# Patient Record
Sex: FEMALE | Race: WHITE | NOT HISPANIC OR LATINO | URBAN - METROPOLITAN AREA
[De-identification: names, ages, dates, MRNs, and addresses within clinical notes are randomized per-mention and may not be internally consistent; named-entity substitution may affect disease eponyms.]

---

## 2017-06-16 ENCOUNTER — OUTPATIENT (OUTPATIENT)
Dept: OUTPATIENT SERVICES | Facility: HOSPITAL | Age: 47
LOS: 1 days | Discharge: HOME | End: 2017-06-16

## 2017-06-16 DIAGNOSIS — N20.0 CALCULUS OF KIDNEY: ICD-10-CM

## 2017-06-16 DIAGNOSIS — L93.0 DISCOID LUPUS ERYTHEMATOSUS: ICD-10-CM

## 2017-06-16 DIAGNOSIS — I21.3 ST ELEVATION (STEMI) MYOCARDIAL INFARCTION OF UNSPECIFIED SITE: ICD-10-CM

## 2017-06-16 DIAGNOSIS — E78.5 HYPERLIPIDEMIA, UNSPECIFIED: ICD-10-CM

## 2017-06-26 ENCOUNTER — OUTPATIENT (OUTPATIENT)
Dept: OUTPATIENT SERVICES | Facility: HOSPITAL | Age: 47
LOS: 1 days | Discharge: HOME | End: 2017-06-26

## 2017-06-26 DIAGNOSIS — L93.0 DISCOID LUPUS ERYTHEMATOSUS: ICD-10-CM

## 2017-06-26 DIAGNOSIS — E78.5 HYPERLIPIDEMIA, UNSPECIFIED: ICD-10-CM

## 2017-06-26 DIAGNOSIS — N20.0 CALCULUS OF KIDNEY: ICD-10-CM

## 2017-06-26 DIAGNOSIS — I21.3 ST ELEVATION (STEMI) MYOCARDIAL INFARCTION OF UNSPECIFIED SITE: ICD-10-CM

## 2017-06-27 ENCOUNTER — OUTPATIENT (OUTPATIENT)
Dept: OUTPATIENT SERVICES | Facility: HOSPITAL | Age: 47
LOS: 1 days | Discharge: HOME | End: 2017-06-27

## 2017-06-27 DIAGNOSIS — E78.5 HYPERLIPIDEMIA, UNSPECIFIED: ICD-10-CM

## 2017-06-27 DIAGNOSIS — Z88.2 ALLERGY STATUS TO SULFONAMIDES: ICD-10-CM

## 2017-06-27 DIAGNOSIS — N25.89 OTHER DISORDERS RESULTING FROM IMPAIRED RENAL TUBULAR FUNCTION: ICD-10-CM

## 2017-06-27 DIAGNOSIS — L93.0 DISCOID LUPUS ERYTHEMATOSUS: ICD-10-CM

## 2017-06-27 DIAGNOSIS — I21.3 ST ELEVATION (STEMI) MYOCARDIAL INFARCTION OF UNSPECIFIED SITE: ICD-10-CM

## 2017-06-27 DIAGNOSIS — N20.0 CALCULUS OF KIDNEY: ICD-10-CM

## 2017-06-27 DIAGNOSIS — M35.00 SJOGREN SYNDROME, UNSPECIFIED: ICD-10-CM

## 2017-06-28 DIAGNOSIS — N25.89 OTHER DISORDERS RESULTING FROM IMPAIRED RENAL TUBULAR FUNCTION: ICD-10-CM

## 2017-06-28 DIAGNOSIS — N20.0 CALCULUS OF KIDNEY: ICD-10-CM

## 2017-06-28 DIAGNOSIS — N18.3 CHRONIC KIDNEY DISEASE, STAGE 3 (MODERATE): ICD-10-CM

## 2017-06-28 DIAGNOSIS — Z01.818 ENCOUNTER FOR OTHER PREPROCEDURAL EXAMINATION: ICD-10-CM

## 2017-08-07 ENCOUNTER — OUTPATIENT (OUTPATIENT)
Dept: OUTPATIENT SERVICES | Facility: HOSPITAL | Age: 47
LOS: 1 days | Discharge: HOME | End: 2017-08-07

## 2017-08-07 DIAGNOSIS — N20.0 CALCULUS OF KIDNEY: ICD-10-CM

## 2017-08-07 DIAGNOSIS — N18.3 CHRONIC KIDNEY DISEASE, STAGE 3 (MODERATE): ICD-10-CM

## 2017-08-07 DIAGNOSIS — N25.89 OTHER DISORDERS RESULTING FROM IMPAIRED RENAL TUBULAR FUNCTION: ICD-10-CM

## 2017-08-07 DIAGNOSIS — I21.3 ST ELEVATION (STEMI) MYOCARDIAL INFARCTION OF UNSPECIFIED SITE: ICD-10-CM

## 2017-08-07 DIAGNOSIS — L93.0 DISCOID LUPUS ERYTHEMATOSUS: ICD-10-CM

## 2017-08-07 DIAGNOSIS — E78.5 HYPERLIPIDEMIA, UNSPECIFIED: ICD-10-CM

## 2017-08-12 ENCOUNTER — INPATIENT (INPATIENT)
Facility: HOSPITAL | Age: 47
LOS: 3 days | Discharge: HOME | End: 2017-08-16
Attending: INTERNAL MEDICINE

## 2017-08-12 DIAGNOSIS — N20.0 CALCULUS OF KIDNEY: ICD-10-CM

## 2017-08-12 DIAGNOSIS — E78.5 HYPERLIPIDEMIA, UNSPECIFIED: ICD-10-CM

## 2017-08-12 DIAGNOSIS — I21.3 ST ELEVATION (STEMI) MYOCARDIAL INFARCTION OF UNSPECIFIED SITE: ICD-10-CM

## 2017-08-12 DIAGNOSIS — L93.0 DISCOID LUPUS ERYTHEMATOSUS: ICD-10-CM

## 2017-08-18 DIAGNOSIS — Z72.0 TOBACCO USE: ICD-10-CM

## 2017-08-18 DIAGNOSIS — M35.00 SJOGREN SYNDROME, UNSPECIFIED: ICD-10-CM

## 2017-08-18 DIAGNOSIS — I73.00 RAYNAUD'S SYNDROME WITHOUT GANGRENE: ICD-10-CM

## 2017-08-18 DIAGNOSIS — I25.10 ATHEROSCLEROTIC HEART DISEASE OF NATIVE CORONARY ARTERY WITHOUT ANGINA PECTORIS: ICD-10-CM

## 2017-08-18 DIAGNOSIS — M32.9 SYSTEMIC LUPUS ERYTHEMATOSUS, UNSPECIFIED: ICD-10-CM

## 2017-08-18 DIAGNOSIS — N25.89 OTHER DISORDERS RESULTING FROM IMPAIRED RENAL TUBULAR FUNCTION: ICD-10-CM

## 2017-08-18 DIAGNOSIS — I21.3 ST ELEVATION (STEMI) MYOCARDIAL INFARCTION OF UNSPECIFIED SITE: ICD-10-CM

## 2017-08-18 DIAGNOSIS — N18.4 CHRONIC KIDNEY DISEASE, STAGE 4 (SEVERE): ICD-10-CM

## 2017-08-18 DIAGNOSIS — Z87.442 PERSONAL HISTORY OF URINARY CALCULI: ICD-10-CM

## 2017-08-21 ENCOUNTER — OUTPATIENT (OUTPATIENT)
Dept: OUTPATIENT SERVICES | Facility: HOSPITAL | Age: 47
LOS: 1 days | Discharge: HOME | End: 2017-08-21

## 2017-08-21 DIAGNOSIS — E78.5 HYPERLIPIDEMIA, UNSPECIFIED: ICD-10-CM

## 2017-08-21 DIAGNOSIS — I21.3 ST ELEVATION (STEMI) MYOCARDIAL INFARCTION OF UNSPECIFIED SITE: ICD-10-CM

## 2017-08-21 DIAGNOSIS — N20.0 CALCULUS OF KIDNEY: ICD-10-CM

## 2017-08-21 DIAGNOSIS — N18.3 CHRONIC KIDNEY DISEASE, STAGE 3 (MODERATE): ICD-10-CM

## 2017-08-21 DIAGNOSIS — L93.0 DISCOID LUPUS ERYTHEMATOSUS: ICD-10-CM

## 2017-08-24 ENCOUNTER — TRANSCRIPTION ENCOUNTER (OUTPATIENT)
Age: 47
End: 2017-08-24

## 2017-08-24 PROBLEM — Z00.00 ENCOUNTER FOR PREVENTIVE HEALTH EXAMINATION: Status: ACTIVE | Noted: 2017-08-24

## 2017-09-18 ENCOUNTER — OUTPATIENT (OUTPATIENT)
Dept: OUTPATIENT SERVICES | Facility: HOSPITAL | Age: 47
LOS: 1 days | Discharge: HOME | End: 2017-09-18

## 2017-09-18 DIAGNOSIS — N25.89 OTHER DISORDERS RESULTING FROM IMPAIRED RENAL TUBULAR FUNCTION: ICD-10-CM

## 2017-09-18 DIAGNOSIS — N20.0 CALCULUS OF KIDNEY: ICD-10-CM

## 2017-09-18 DIAGNOSIS — L93.0 DISCOID LUPUS ERYTHEMATOSUS: ICD-10-CM

## 2017-09-18 DIAGNOSIS — N18.3 CHRONIC KIDNEY DISEASE, STAGE 3 (MODERATE): ICD-10-CM

## 2017-09-18 DIAGNOSIS — E78.5 HYPERLIPIDEMIA, UNSPECIFIED: ICD-10-CM

## 2017-09-18 DIAGNOSIS — I21.3 ST ELEVATION (STEMI) MYOCARDIAL INFARCTION OF UNSPECIFIED SITE: ICD-10-CM

## 2017-10-27 ENCOUNTER — APPOINTMENT (OUTPATIENT)
Dept: VASCULAR SURGERY | Facility: CLINIC | Age: 47
End: 2017-10-27
Payer: COMMERCIAL

## 2017-10-27 VITALS
SYSTOLIC BLOOD PRESSURE: 132 MMHG | WEIGHT: 128 LBS | BODY MASS INDEX: 24.17 KG/M2 | HEIGHT: 61 IN | DIASTOLIC BLOOD PRESSURE: 72 MMHG

## 2017-10-27 DIAGNOSIS — Z87.891 PERSONAL HISTORY OF NICOTINE DEPENDENCE: ICD-10-CM

## 2017-10-27 DIAGNOSIS — M35.00 SICCA SYNDROME, UNSPECIFIED: ICD-10-CM

## 2017-10-27 DIAGNOSIS — I25.10 ATHEROSCLEROTIC HEART DISEASE OF NATIVE CORONARY ARTERY W/OUT ANGINA PECTORIS: ICD-10-CM

## 2017-10-27 DIAGNOSIS — M32.9 SYSTEMIC LUPUS ERYTHEMATOSUS, UNSPECIFIED: ICD-10-CM

## 2017-10-27 DIAGNOSIS — I73.9 PERIPHERAL VASCULAR DISEASE, UNSPECIFIED: ICD-10-CM

## 2017-10-27 PROCEDURE — 93925 LOWER EXTREMITY STUDY: CPT

## 2017-10-27 PROCEDURE — 93978 VASCULAR STUDY: CPT

## 2017-10-27 PROCEDURE — 99203 OFFICE O/P NEW LOW 30 MIN: CPT

## 2017-10-27 RX ORDER — TICAGRELOR 90 MG/1
90 TABLET ORAL
Refills: 0 | Status: ACTIVE | COMMUNITY

## 2017-10-27 RX ORDER — POTASSIUM CITRATE 10 MEQ/1
10 MEQ TABLET, EXTENDED RELEASE ORAL
Refills: 0 | Status: ACTIVE | COMMUNITY

## 2017-10-27 RX ORDER — METOPROLOL SUCCINATE 25 MG/1
25 TABLET, EXTENDED RELEASE ORAL
Refills: 0 | Status: ACTIVE | COMMUNITY

## 2017-10-27 RX ORDER — LOSARTAN POTASSIUM 25 MG/1
25 TABLET, FILM COATED ORAL
Refills: 0 | Status: ACTIVE | COMMUNITY

## 2017-10-27 RX ORDER — ASPIRIN 81 MG
81 TABLET, DELAYED RELEASE (ENTERIC COATED) ORAL
Refills: 0 | Status: ACTIVE | COMMUNITY

## 2017-11-02 ENCOUNTER — INPATIENT (INPATIENT)
Facility: HOSPITAL | Age: 47
LOS: 0 days | Discharge: HOME | End: 2017-11-03
Attending: SURGERY

## 2017-11-02 DIAGNOSIS — L93.0 DISCOID LUPUS ERYTHEMATOSUS: ICD-10-CM

## 2017-11-02 DIAGNOSIS — E78.5 HYPERLIPIDEMIA, UNSPECIFIED: ICD-10-CM

## 2017-11-02 DIAGNOSIS — N20.0 CALCULUS OF KIDNEY: ICD-10-CM

## 2017-11-02 DIAGNOSIS — I21.3 ST ELEVATION (STEMI) MYOCARDIAL INFARCTION OF UNSPECIFIED SITE: ICD-10-CM

## 2017-11-06 DIAGNOSIS — M35.00 SJOGREN SYNDROME, UNSPECIFIED: ICD-10-CM

## 2017-11-06 DIAGNOSIS — Z96.0 PRESENCE OF UROGENITAL IMPLANTS: ICD-10-CM

## 2017-11-06 DIAGNOSIS — Z95.5 PRESENCE OF CORONARY ANGIOPLASTY IMPLANT AND GRAFT: ICD-10-CM

## 2017-11-06 DIAGNOSIS — N25.89 OTHER DISORDERS RESULTING FROM IMPAIRED RENAL TUBULAR FUNCTION: ICD-10-CM

## 2017-11-06 DIAGNOSIS — I25.2 OLD MYOCARDIAL INFARCTION: ICD-10-CM

## 2017-11-06 DIAGNOSIS — N18.3 CHRONIC KIDNEY DISEASE, STAGE 3 (MODERATE): ICD-10-CM

## 2017-11-06 DIAGNOSIS — I73.9 PERIPHERAL VASCULAR DISEASE, UNSPECIFIED: ICD-10-CM

## 2017-11-06 DIAGNOSIS — I70.213 ATHEROSCLEROSIS OF NATIVE ARTERIES OF EXTREMITIES WITH INTERMITTENT CLAUDICATION, BILATERAL LEGS: ICD-10-CM

## 2017-11-06 DIAGNOSIS — E78.5 HYPERLIPIDEMIA, UNSPECIFIED: ICD-10-CM

## 2017-11-06 DIAGNOSIS — F17.210 NICOTINE DEPENDENCE, CIGARETTES, UNCOMPLICATED: ICD-10-CM

## 2017-11-06 DIAGNOSIS — N20.0 CALCULUS OF KIDNEY: ICD-10-CM

## 2017-11-06 DIAGNOSIS — M32.9 SYSTEMIC LUPUS ERYTHEMATOSUS, UNSPECIFIED: ICD-10-CM

## 2017-11-06 DIAGNOSIS — I25.10 ATHEROSCLEROTIC HEART DISEASE OF NATIVE CORONARY ARTERY WITHOUT ANGINA PECTORIS: ICD-10-CM

## 2017-12-01 ENCOUNTER — APPOINTMENT (OUTPATIENT)
Dept: VASCULAR SURGERY | Facility: CLINIC | Age: 47
End: 2017-12-01
Payer: COMMERCIAL

## 2017-12-01 PROCEDURE — 99213 OFFICE O/P EST LOW 20 MIN: CPT

## 2018-02-09 ENCOUNTER — APPOINTMENT (OUTPATIENT)
Dept: VASCULAR SURGERY | Facility: CLINIC | Age: 48
End: 2018-02-09
Payer: COMMERCIAL

## 2018-02-09 PROCEDURE — 99212 OFFICE O/P EST SF 10 MIN: CPT

## 2018-02-23 ENCOUNTER — OUTPATIENT (OUTPATIENT)
Dept: OUTPATIENT SERVICES | Facility: HOSPITAL | Age: 48
LOS: 1 days | Discharge: HOME | End: 2018-02-23

## 2018-02-23 VITALS
TEMPERATURE: 97 F | DIASTOLIC BLOOD PRESSURE: 76 MMHG | OXYGEN SATURATION: 99 % | WEIGHT: 141.98 LBS | RESPIRATION RATE: 16 BRPM | SYSTOLIC BLOOD PRESSURE: 118 MMHG | HEART RATE: 77 BPM | HEIGHT: 61 IN

## 2018-02-23 DIAGNOSIS — Z98.890 OTHER SPECIFIED POSTPROCEDURAL STATES: Chronic | ICD-10-CM

## 2018-02-23 DIAGNOSIS — Z98.891 HISTORY OF UTERINE SCAR FROM PREVIOUS SURGERY: Chronic | ICD-10-CM

## 2018-02-23 DIAGNOSIS — Z01.818 ENCOUNTER FOR OTHER PREPROCEDURAL EXAMINATION: ICD-10-CM

## 2018-02-23 DIAGNOSIS — I74.09 OTHER ARTERIAL EMBOLISM AND THROMBOSIS OF ABDOMINAL AORTA: ICD-10-CM

## 2018-02-23 LAB
BLD GP AB SCN SERPL QL: SIGNIFICANT CHANGE UP
TYPE + AB SCN PNL BLD: SIGNIFICANT CHANGE UP

## 2018-02-23 RX ORDER — METOPROLOL TARTRATE 50 MG
1 TABLET ORAL
Qty: 0 | Refills: 0 | COMMUNITY

## 2018-02-23 NOTE — H&P PST ADULT - REASON FOR ADMISSION
pt for aorto-bifemoral bypass  pt c/o leg heaviness and inability to walk on-going for two years saw vascular and now for scheduled procedure   pt denies current cp,sob,palp,cough,dysuria   cannot assess ex prasad pt unable to walk -limited by decreased circulation pt states this is complete med/surg history

## 2018-02-23 NOTE — H&P PST ADULT - PMH
CAD (coronary artery disease)    Chronic kidney disease  stage III  H/O heart artery stent  lad  Lupus    Myocardial infarction  8-2017  Renal tubular acidosis    Sjogren's disease

## 2018-02-24 LAB
ALBUMIN SERPL ELPH-MCNC: 4.6 G/DL — SIGNIFICANT CHANGE UP (ref 3–5.5)
ALP SERPL-CCNC: 95 U/L — SIGNIFICANT CHANGE UP (ref 30–115)
ALT FLD-CCNC: 27 U/L — SIGNIFICANT CHANGE UP (ref 0–41)
ANION GAP SERPL CALC-SCNC: 6 MMOL/L — LOW (ref 7–14)
APPEARANCE UR: CLEAR — SIGNIFICANT CHANGE UP
APTT BLD: 26.3 SEC — LOW (ref 27–39.2)
AST SERPL-CCNC: 18 U/L — SIGNIFICANT CHANGE UP (ref 0–41)
BASOPHILS # BLD AUTO: 0.04 K/UL — SIGNIFICANT CHANGE UP (ref 0–0.2)
BASOPHILS NFR BLD AUTO: 0.5 % — SIGNIFICANT CHANGE UP (ref 0–1)
BILIRUB SERPL-MCNC: 0.4 MG/DL — SIGNIFICANT CHANGE UP (ref 0.2–1.2)
BILIRUB UR-MCNC: NEGATIVE — SIGNIFICANT CHANGE UP
BUN SERPL-MCNC: 24 MG/DL — HIGH (ref 10–20)
CALCIUM SERPL-MCNC: 9.8 MG/DL — SIGNIFICANT CHANGE UP (ref 8.5–10.1)
CHLORIDE SERPL-SCNC: 109 MMOL/L — SIGNIFICANT CHANGE UP (ref 98–110)
CO2 SERPL-SCNC: 24 MMOL/L — SIGNIFICANT CHANGE UP (ref 17–32)
COLOR SPEC: YELLOW — SIGNIFICANT CHANGE UP
CREAT SERPL-MCNC: 1.6 MG/DL — HIGH (ref 0.7–1.5)
DIFF PNL FLD: NEGATIVE — SIGNIFICANT CHANGE UP
EOSINOPHIL # BLD AUTO: 0.23 K/UL — SIGNIFICANT CHANGE UP (ref 0–0.7)
EOSINOPHIL NFR BLD AUTO: 3 % — SIGNIFICANT CHANGE UP (ref 0–8)
GLUCOSE SERPL-MCNC: 73 MG/DL — SIGNIFICANT CHANGE UP (ref 70–110)
GLUCOSE UR QL: NEGATIVE MG/DL — SIGNIFICANT CHANGE UP
HCT VFR BLD CALC: 39.3 % — SIGNIFICANT CHANGE UP (ref 37–47)
HGB BLD-MCNC: 12.8 G/DL — LOW (ref 14–18)
IMM GRANULOCYTES NFR BLD AUTO: 0.3 % — SIGNIFICANT CHANGE UP (ref 0.1–0.3)
INR BLD: 0.92 RATIO — SIGNIFICANT CHANGE UP (ref 0.65–1.3)
KETONES UR-MCNC: NEGATIVE — SIGNIFICANT CHANGE UP
LEUKOCYTE ESTERASE UR-ACNC: NEGATIVE — SIGNIFICANT CHANGE UP
LYMPHOCYTES # BLD AUTO: 3.03 K/UL — SIGNIFICANT CHANGE UP (ref 1.2–3.4)
LYMPHOCYTES # BLD AUTO: 39.5 % — SIGNIFICANT CHANGE UP (ref 20.5–51.1)
MCHC RBC-ENTMCNC: 30.8 PG — SIGNIFICANT CHANGE UP (ref 27–31)
MCHC RBC-ENTMCNC: 32.6 G/DL — SIGNIFICANT CHANGE UP (ref 32–37)
MCV RBC AUTO: 94.5 FL — HIGH (ref 81–91)
MONOCYTES # BLD AUTO: 0.99 K/UL — HIGH (ref 0.1–0.6)
MONOCYTES NFR BLD AUTO: 12.9 % — HIGH (ref 1.7–9.3)
NEUTROPHILS # BLD AUTO: 3.36 K/UL — SIGNIFICANT CHANGE UP (ref 1.4–6.5)
NEUTROPHILS NFR BLD AUTO: 43.8 % — SIGNIFICANT CHANGE UP (ref 42.2–75.2)
NITRITE UR-MCNC: NEGATIVE — SIGNIFICANT CHANGE UP
NRBC # BLD: 0 /100 WBCS — SIGNIFICANT CHANGE UP (ref 0–0)
PH UR: 7 — SIGNIFICANT CHANGE UP (ref 5–8)
PLATELET # BLD AUTO: 304 K/UL — SIGNIFICANT CHANGE UP (ref 130–400)
POTASSIUM SERPL-MCNC: 4.8 MMOL/L — SIGNIFICANT CHANGE UP (ref 3.5–5)
POTASSIUM SERPL-SCNC: 4.8 MMOL/L — SIGNIFICANT CHANGE UP (ref 3.5–5)
PROT SERPL-MCNC: 8.3 G/DL — HIGH (ref 6–8)
PROT UR-MCNC: NEGATIVE MG/DL — SIGNIFICANT CHANGE UP
PROTHROM AB SERPL-ACNC: 9.9 SEC — LOW (ref 9.95–12.87)
RBC # BLD: 4.16 M/UL — LOW (ref 4.2–5.4)
RBC # FLD: 13.4 % — SIGNIFICANT CHANGE UP (ref 11.5–14.5)
SODIUM SERPL-SCNC: 139 MMOL/L — SIGNIFICANT CHANGE UP (ref 135–146)
SP GR SPEC: 1.01 — SIGNIFICANT CHANGE UP (ref 1.01–1.03)
UROBILINOGEN FLD QL: 0.2 MG/DL — SIGNIFICANT CHANGE UP (ref 0.2–0.2)
WBC # BLD: 7.67 K/UL — SIGNIFICANT CHANGE UP (ref 4.8–10.8)
WBC # FLD AUTO: 7.67 K/UL — SIGNIFICANT CHANGE UP (ref 4.8–10.8)

## 2018-03-05 ENCOUNTER — INPATIENT (INPATIENT)
Facility: HOSPITAL | Age: 48
LOS: 6 days | Discharge: HOME | End: 2018-03-12
Attending: SURGERY

## 2018-03-05 ENCOUNTER — APPOINTMENT (OUTPATIENT)
Dept: VASCULAR SURGERY | Facility: HOSPITAL | Age: 48
End: 2018-03-05
Payer: COMMERCIAL

## 2018-03-05 ENCOUNTER — RESULT REVIEW (OUTPATIENT)
Age: 48
End: 2018-03-05

## 2018-03-05 VITALS
HEIGHT: 61 IN | SYSTOLIC BLOOD PRESSURE: 92 MMHG | WEIGHT: 139.99 LBS | DIASTOLIC BLOOD PRESSURE: 59 MMHG | RESPIRATION RATE: 18 BRPM | HEART RATE: 69 BPM | TEMPERATURE: 98 F

## 2018-03-05 DIAGNOSIS — Z98.890 OTHER SPECIFIED POSTPROCEDURAL STATES: Chronic | ICD-10-CM

## 2018-03-05 DIAGNOSIS — Z98.891 HISTORY OF UTERINE SCAR FROM PREVIOUS SURGERY: Chronic | ICD-10-CM

## 2018-03-05 LAB
ABO RH CONFIRMATION: SIGNIFICANT CHANGE UP
ANION GAP SERPL CALC-SCNC: 9 MMOL/L — SIGNIFICANT CHANGE UP (ref 7–14)
BASE EXCESS BLDA CALC-SCNC: -6.2 MMOL/L — LOW (ref -2–2)
BASOPHILS # BLD AUTO: 0.04 K/UL — SIGNIFICANT CHANGE UP (ref 0–0.2)
BASOPHILS NFR BLD AUTO: 0.2 % — SIGNIFICANT CHANGE UP (ref 0–1)
BUN SERPL-MCNC: 20 MG/DL — SIGNIFICANT CHANGE UP (ref 10–20)
CALCIUM SERPL-MCNC: 8.8 MG/DL — SIGNIFICANT CHANGE UP (ref 8.5–10.1)
CHLORIDE SERPL-SCNC: 109 MMOL/L — SIGNIFICANT CHANGE UP (ref 98–110)
CO2 SERPL-SCNC: 19 MMOL/L — SIGNIFICANT CHANGE UP (ref 17–32)
CREAT SERPL-MCNC: 1.4 MG/DL — SIGNIFICANT CHANGE UP (ref 0.7–1.5)
EOSINOPHIL # BLD AUTO: 0 K/UL — SIGNIFICANT CHANGE UP (ref 0–0.7)
EOSINOPHIL NFR BLD AUTO: 0 % — SIGNIFICANT CHANGE UP (ref 0–8)
GAS PNL BLDA: SIGNIFICANT CHANGE UP
GLUCOSE SERPL-MCNC: 187 MG/DL — HIGH (ref 70–110)
HCO3 BLDA-SCNC: 19 MMOL/L — LOW (ref 23–27)
HCT VFR BLD CALC: 34.2 % — LOW (ref 37–47)
HGB BLD-MCNC: 11.5 G/DL — LOW (ref 12–16)
HOROWITZ INDEX BLDA+IHG-RTO: 32 — SIGNIFICANT CHANGE UP
IMM GRANULOCYTES NFR BLD AUTO: 0.3 % — SIGNIFICANT CHANGE UP (ref 0.1–0.3)
LYMPHOCYTES # BLD AUTO: 1 K/UL — LOW (ref 1.2–3.4)
LYMPHOCYTES # BLD AUTO: 5.5 % — LOW (ref 20.5–51.1)
MAGNESIUM SERPL-MCNC: 2 MG/DL — SIGNIFICANT CHANGE UP (ref 1.8–2.4)
MCHC RBC-ENTMCNC: 31.3 PG — HIGH (ref 27–31)
MCHC RBC-ENTMCNC: 33.6 G/DL — SIGNIFICANT CHANGE UP (ref 32–37)
MCV RBC AUTO: 92.9 FL — SIGNIFICANT CHANGE UP (ref 81–99)
MONOCYTES # BLD AUTO: 1.06 K/UL — HIGH (ref 0.1–0.6)
MONOCYTES NFR BLD AUTO: 5.8 % — SIGNIFICANT CHANGE UP (ref 1.7–9.3)
NEUTROPHILS # BLD AUTO: 16.1 K/UL — HIGH (ref 1.4–6.5)
NEUTROPHILS NFR BLD AUTO: 88.2 % — HIGH (ref 42.2–75.2)
PCO2 BLDA: 38 MMHG — SIGNIFICANT CHANGE UP (ref 38–42)
PH BLDA: 7.32 — LOW (ref 7.38–7.42)
PLATELET # BLD AUTO: 237 K/UL — SIGNIFICANT CHANGE UP (ref 130–400)
PO2 BLDA: 145 MMHG — HIGH (ref 78–95)
POTASSIUM SERPL-MCNC: 4.3 MMOL/L — SIGNIFICANT CHANGE UP (ref 3.5–5)
POTASSIUM SERPL-SCNC: 4.3 MMOL/L — SIGNIFICANT CHANGE UP (ref 3.5–5)
RBC # BLD: 3.68 M/UL — LOW (ref 4.2–5.4)
RBC # FLD: 13.2 % — SIGNIFICANT CHANGE UP (ref 11.5–14.5)
SAO2 % BLDA: 99 % — HIGH (ref 94–98)
SODIUM SERPL-SCNC: 137 MMOL/L — SIGNIFICANT CHANGE UP (ref 135–146)
WBC # BLD: 18.25 K/UL — HIGH (ref 4.8–10.8)
WBC # FLD AUTO: 18.25 K/UL — HIGH (ref 4.8–10.8)

## 2018-03-05 PROCEDURE — 35646 BPG AORTOBIFEMORAL: CPT

## 2018-03-05 PROCEDURE — 35371 RECHANNELING OF ARTERY: CPT | Mod: 50,59

## 2018-03-05 RX ORDER — MEPERIDINE HYDROCHLORIDE 50 MG/ML
12.5 INJECTION INTRAMUSCULAR; INTRAVENOUS; SUBCUTANEOUS
Qty: 0 | Refills: 0 | Status: DISCONTINUED | OUTPATIENT
Start: 2018-03-05 | End: 2018-03-06

## 2018-03-05 RX ORDER — SODIUM CHLORIDE 9 MG/ML
1000 INJECTION, SOLUTION INTRAVENOUS
Qty: 0 | Refills: 0 | Status: DISCONTINUED | OUTPATIENT
Start: 2018-03-05 | End: 2018-03-06

## 2018-03-05 RX ORDER — METOPROLOL TARTRATE 50 MG
TABLET ORAL
Qty: 0 | Refills: 0 | Status: DISCONTINUED | OUTPATIENT
Start: 2018-03-05 | End: 2018-03-06

## 2018-03-05 RX ORDER — MORPHINE SULFATE 50 MG/1
30 CAPSULE, EXTENDED RELEASE ORAL
Qty: 0 | Refills: 0 | Status: DISCONTINUED | OUTPATIENT
Start: 2018-03-05 | End: 2018-03-06

## 2018-03-05 RX ORDER — CEFAZOLIN SODIUM 1 G
1000 VIAL (EA) INJECTION EVERY 8 HOURS
Qty: 0 | Refills: 0 | Status: COMPLETED | OUTPATIENT
Start: 2018-03-06 | End: 2018-03-06

## 2018-03-05 RX ORDER — CEFAZOLIN SODIUM 1 G
VIAL (EA) INJECTION
Qty: 0 | Refills: 0 | Status: COMPLETED | OUTPATIENT
Start: 2018-03-05 | End: 2018-03-07

## 2018-03-05 RX ORDER — MORPHINE SULFATE 50 MG/1
2 CAPSULE, EXTENDED RELEASE ORAL
Qty: 0 | Refills: 0 | Status: DISCONTINUED | OUTPATIENT
Start: 2018-03-05 | End: 2018-03-06

## 2018-03-05 RX ORDER — CEFAZOLIN SODIUM 1 G
1000 VIAL (EA) INJECTION ONCE
Qty: 0 | Refills: 0 | Status: COMPLETED | OUTPATIENT
Start: 2018-03-05 | End: 2018-03-05

## 2018-03-05 RX ORDER — METOPROLOL TARTRATE 50 MG
5 TABLET ORAL ONCE
Qty: 0 | Refills: 0 | Status: COMPLETED | OUTPATIENT
Start: 2018-03-05 | End: 2018-03-05

## 2018-03-05 RX ORDER — DEXTROSE MONOHYDRATE, SODIUM CHLORIDE, AND POTASSIUM CHLORIDE 50; .745; 4.5 G/1000ML; G/1000ML; G/1000ML
1000 INJECTION, SOLUTION INTRAVENOUS
Qty: 0 | Refills: 0 | Status: DISCONTINUED | OUTPATIENT
Start: 2018-03-05 | End: 2018-03-07

## 2018-03-05 RX ORDER — MORPHINE SULFATE 50 MG/1
4 CAPSULE, EXTENDED RELEASE ORAL
Qty: 0 | Refills: 0 | Status: DISCONTINUED | OUTPATIENT
Start: 2018-03-05 | End: 2018-03-06

## 2018-03-05 RX ORDER — ONDANSETRON 8 MG/1
4 TABLET, FILM COATED ORAL ONCE
Qty: 0 | Refills: 0 | Status: DISCONTINUED | OUTPATIENT
Start: 2018-03-05 | End: 2018-03-06

## 2018-03-05 RX ORDER — METOPROLOL TARTRATE 50 MG
5 TABLET ORAL EVERY 6 HOURS
Qty: 0 | Refills: 0 | Status: DISCONTINUED | OUTPATIENT
Start: 2018-03-06 | End: 2018-03-06

## 2018-03-05 RX ADMIN — MORPHINE SULFATE 30 MILLILITER(S): 50 CAPSULE, EXTENDED RELEASE ORAL at 21:00

## 2018-03-05 RX ADMIN — MORPHINE SULFATE 4 MILLIGRAM(S): 50 CAPSULE, EXTENDED RELEASE ORAL at 19:00

## 2018-03-05 RX ADMIN — MORPHINE SULFATE 4 MILLIGRAM(S): 50 CAPSULE, EXTENDED RELEASE ORAL at 20:00

## 2018-03-05 RX ADMIN — DEXTROSE MONOHYDRATE, SODIUM CHLORIDE, AND POTASSIUM CHLORIDE 75 MILLILITER(S): 50; .745; 4.5 INJECTION, SOLUTION INTRAVENOUS at 22:00

## 2018-03-05 RX ADMIN — Medication 110 MILLIGRAM(S): at 21:08

## 2018-03-05 RX ADMIN — MORPHINE SULFATE 4 MILLIGRAM(S): 50 CAPSULE, EXTENDED RELEASE ORAL at 19:48

## 2018-03-05 RX ADMIN — MORPHINE SULFATE 4 MILLIGRAM(S): 50 CAPSULE, EXTENDED RELEASE ORAL at 18:49

## 2018-03-05 RX ADMIN — SODIUM CHLORIDE 100 MILLILITER(S): 9 INJECTION, SOLUTION INTRAVENOUS at 18:30

## 2018-03-05 RX ADMIN — Medication 100 MILLIGRAM(S): at 21:53

## 2018-03-05 RX ADMIN — SODIUM CHLORIDE 100 MILLILITER(S): 9 INJECTION, SOLUTION INTRAVENOUS at 20:46

## 2018-03-05 NOTE — BRIEF OPERATIVE NOTE - OPERATION/FINDINGS
Small vessel size. Occluded distal aorta. End-to-end aortobifemoral bypass distal to the renal vessels, above the ROHINI. Minimal soft eccentric plaque in the left common femoral artery. Bilateral CFA, SFA, PFA patent and soft.

## 2018-03-05 NOTE — PROGRESS NOTE ADULT - SUBJECTIVE AND OBJECTIVE BOX
Procedure: Status post aortobifem  Post Operative day #1    Pmh:  Myocardial infarction  Chronic kidney disease  H/O heart artery stent  CAD (coronary artery disease)  Renal tubular acidosis  Lupus  Sjogren's disease  Claudication of both lower extremities  Aortobifemoral bypass graft  H/O  section  H/O cystoscopy  H/O lithotripsy     allergies:sulfa drugs (Rash)    Meds:  ceFAZolin   IVPB      dextrose 5% + sodium chloride 0.45% with potassium chloride 20 mEq/L 1000 milliLiter(s) IV Continuous <Continuous>  lactated ringers. 1000 milliLiter(s) IV Continuous <Continuous>  meperidine     Injectable 12.5 milliGRAM(s) IV Push every 10 minutes PRN  metoprolol   tartrate IVPB      morphine  - Injectable 4 milliGRAM(s) IV Push every 10 minutes PRN  morphine  - Injectable 2 milliGRAM(s) IV Push every 10 minutes PRN  morphine PCA (1 mG/mL) 30 milliLiter(s) PCA Continuous PCA Continuous  ondansetron Injectable 4 milliGRAM(s) IV Push once PRN      Vitals:    T(C): 36.9 (18 @ 21:00), Max: 36.9 (18 @ 21:00)  HR: 90 (18 @ 19:34) (69 - 98)  BP: 128/71 (18 @ 19:34) (92/59 - 128/71)  RR: 17 (18 @ 19:34) (12 - 18)  SpO2: 96% (18 @ 19:34) (96% - 98%)    18 @ 07:01  -  18 @ 22:09  --------------------------------------------------------  IN: 100 mL / OUT: 250 mL / NET: -150 mL      General:Alert and oriented times 3, not in acute distress   Heart: Regular rate and rhythm, no rubs , murmurs or gallops  Lungs: Clear to auscultation bilaterally, no wheezes, rales, rhonci appreciated  Abdomen: Soft , positive bowel sounds, no tenderness, no distention, no peritoneal signs ,incision clean dry and intact  Exremites:Groin-  b/l incisions clean dry and intact,  warm extremities,  good color, no swelling,no hematomas, motor and sensation , pulses present bilaterally. Absent left posterior tibialis pulse.       Labs:             11.5   18.25 )-----------( 237      (  @ 20:18 )             34.2                    137   |  109   |  20                 Ca: 8.8    BMP:   ----------------------------< 187    M.0   (18 @ 20:18)             4.3    |  19    | 1.4                Ph: x          ABG - ( 05 Mar 2018 20:00 )  pH: 7.32  /  pCO2: 38    /  pO2: 145   / HCO3: 19    / Base Excess: -6.2  /  SaO2: 99

## 2018-03-05 NOTE — CONSULT NOTE ADULT - ASSESSMENT
ASSESSMENT:  47y Female ***    PLAN:   Neurologic: gcs 15, morphine pca for pain  Respiratory: cta b/l  Cardiovascular: hx of recent mi s/p stent, htn +s1s2, rrr, continue metoprolol iv , hold asa/brilinta   Gastrointestinal/Nutrition: soft mild perincisional pain npo, ngt    Renal/Genitourinary: renal tubular acidosis, ckd4 making good urine, barroso, continue ivf, trend cr 1.6 preop > 1.4 post op  Hematologic: pad lupus, sjogren syndrome, dopplerable pulses bilaterally hold heparin sq, no scds per vascular team  Infectious Disease: ancef x 24hs   Lines/Tubes: piv, r rad coty, barroso  Endocrine: no diagnosisis   Disposition: icu monitering    --------------------------------------------------------------------------------------    Critical Care Diagnoses:

## 2018-03-05 NOTE — PROGRESS NOTE ADULT - ASSESSMENT
Assessment and Plan:  Pt. 47yFemale status post aortobifem    -Ngt in place record outpt  -Jackson in place record outpt  -Pain managment  - NPO   -ICU managment    LALY Juan   03-05-18 @ 22:09  # [id number]/ [id number]

## 2018-03-05 NOTE — BRIEF OPERATIVE NOTE - PROCEDURE
<<-----Click on this checkbox to enter Procedure Aortobifemoral bypass graft  03/05/2018  Aortobifemoral bypass with Dacron graft  Active  RAHEEL

## 2018-03-05 NOTE — ASU PREOP CHECKLIST - AS TEMP SITE
"Anesthesia Post Evaluation    Patient: Jovan Del Cid Jr.    Procedure(s) Performed: Procedure(s) (LRB):  COLONOSCOPY (N/A)    Final Anesthesia Type: MAC  Patient location during evaluation: PACU  Patient participation: Yes- Able to Participate  Level of consciousness: awake  Post-procedure vital signs: reviewed and stable  Pain management: adequate  Airway patency: patent  PONV status at discharge: No PONV  Anesthetic complications: no      Cardiovascular status: blood pressure returned to baseline and hemodynamically stable  Respiratory status: unassisted, spontaneous ventilation and room air  Hydration status: euvolemic  Follow-up not needed.        Visit Vitals  /80 (BP Location: Left arm, Patient Position: Lying)   Pulse 69   Temp 36.9 °C (98.4 °F) (Oral)   Resp 18   Ht 5' 7" (1.702 m)   Wt 96.6 kg (213 lb)   SpO2 95%   BMI 33.36 kg/m²       Pain/Roxie Score: Pain Assessment Performed: Yes (11/13/2017  8:55 AM)  Presence of Pain: denies (11/13/2017  8:55 AM)      " oral

## 2018-03-05 NOTE — PRE-ANESTHESIA EVALUATION ADULT - NSANTHOSAYNRD_GEN_A_CORE
No. HENRI screening performed.  STOP BANG Legend: 0-2 = LOW Risk; 3-4 = INTERMEDIATE Risk; 5-8 = HIGH Risk

## 2018-03-05 NOTE — CONSULT NOTE ADULT - SUBJECTIVE AND OBJECTIVE BOX
SICU Consultation Note  =====================================================  HPI: 47y Female  HPI:   48yo female with history of recent mi s/p pci found to have occluded  distal aorta with claudication taken today for aortobifemoral bypass, stable during procedure, received hydrazine 10mg prior before pacu for hypertension       Surgery Information  OR time:  5h    EBL:   300       IV Fluids:3600       Blood Products:   UOP:   705       PAST MEDICAL & SURGICAL HISTORY:  Myocardial infarction: , ef 55-65%  Chronic kidney disease: stage III  H/O heart artery stent: lad  CAD (coronary artery disease)  Renal tubular acidosis  Lupus  Sjogren's disease  H/O  section  H/O cystoscopy: last being 318-16  H/O lithotripsy: eswl x5 last being -17    Home Meds: Home Medications:  Aspir 81: orally once a day (05 Mar 2018 10:16)  Brilinta (ticagrelor) 90 mg oral tablet: 1 tab(s) orally 2 times a day (05 Mar 2018 10:16)  Evoxac 30 mg oral capsule: orally 4 times a day (05 Mar 2018 10:16)  losartan 25 mg oral tablet: 1 tab(s) orally once a day (in the evening) (05 Mar 2018 10:16)  metoprolol succinate 25 mg oral tablet, extended release: 1 tab(s) orally once (at bedtime) (05 Mar 2018 10:16)  simvastatin 20 mg oral tablet: 1 tab(s) orally once a day (at bedtime) (05 Mar 2018 10:16)  traMADol: 75 milligram(s) orally once a day (05 Mar 2018 10:16)  Urocit-K 10 mEq oral tablet, extended release: 1 tab(s) orally 3 times a day (05 Mar 2018 10:16)    Allergies: Allergies    sulfa drugs (Rash)    Intolerances      Soc:   Advanced Directives: Presumed Full Code     ROS:    REVIEW OF SYSTEMS    [ ] A ten-point review of systems was otherwise negative except as noted.      CURRENT MEDICATIONS:   --------------------------------------------------------------------------------------  Neurologic Medications  meperidine     Injectable 12.5 milliGRAM(s) IV Push every 10 minutes PRN Shivering  morphine  - Injectable 4 milliGRAM(s) IV Push every 10 minutes PRN Severe Pain  morphine  - Injectable 2 milliGRAM(s) IV Push every 10 minutes PRN Moderate Pain (4 - 6)  morphine PCA (1 mG/mL) 30 milliLiter(s) PCA Continuous PCA Continuous  ondansetron Injectable 4 milliGRAM(s) IV Push once PRN Nausea and/or Vomiting    Respiratory Medications    Cardiovascular Medications  metoprolol   tartrate IVPB        Gastrointestinal Medications  dextrose 5% + sodium chloride 0.45% with potassium chloride 20 mEq/L 1000 milliLiter(s) IV Continuous <Continuous>  lactated ringers. 1000 milliLiter(s) IV Continuous <Continuous>    Genitourinary Medications    Hematologic/Oncologic Medications    Antimicrobial/Immunologic Medications  ceFAZolin   IVPB 1000 milliGRAM(s) IV Intermittent once  ceFAZolin   IVPB        Endocrine/Metabolic Medications    Topical/Other Medications    --------------------------------------------------------------------------------------    VITAL SIGNS, INS/OUTS (last 24 hours):  --------------------------------------------------------------------------------------  ICU Vital Signs Last 24 Hrs  T(C): 36.6 (05 Mar 2018 19:34), Max: 36.8 (05 Mar 2018 10:18)  T(F): 97.8 (05 Mar 2018 19:34), Max: 98.2 (05 Mar 2018 10:18)  HR: 90 (05 Mar 2018 19:34) (69 - 98)  BP: 128/71 (05 Mar 2018 19:34) (92/59 - 128/71)  BP(mean): --  ABP: 134/62 (05 Mar 2018 19:34) (134/62 - 152/62)  ABP(mean): --  RR: 17 (05 Mar 2018 19:34) (12 - 18)  SpO2: 96% (05 Mar 2018 19:34) (96% - 98%)    I&O's Summary    05 Mar 2018 07:01  -  05 Mar 2018 21:10  --------------------------------------------------------  IN: 100 mL / OUT: 250 mL / NET: -150 mL      --------------------------------------------------------------------------------------    EXAM:  General/Neuro  Exam: Normal, NAD, alert, oriented x 3, no focal deficits. PERRLA      Respiratory  Exam: Lungs clear to auscultation, Normal expansion/effort.     Cardiovascular  Exam: S1, S2.  Regular rate and rhythm.    Cardiac Rhythm: Normal Sinus Rhythm  ECHO: 55-65%     GI  Exam: Abdomen soft, Non-tender, Non-distended.    Nasogastric tube in place.   Wound:   abdominal and groin incisions c/d/i soft no hematoma  Current Diet:  NPO      Tubes/Lines/Drains  ***  [x] Peripheral IV  [x] Arterial Line		[x] R	[] L	[] Fem	[x Rad	[] Ax	Date Placed: 3/5/18          [x] Urinary Catheter		Date Placed: 3/5/18    Extremities  Exam: Extremities warm, pink, well-perfused.  dopplerable and palpable pt/dp on right, left pt palp, dp dopplerable c/w prior to surgery      Derm:  Exam: Good skin turgor, no skin breakdown.      :   Exam: Jackson catheter in place.     LABS  --------------------------------------------------------------------------------------  Labs:  CAPILLARY BLOOD GLUCOSE                              11.5   18.25 )-----------( 237      ( 05 Mar 2018 20:18 )             34.2       Auto Neutrophil %: 88.2 % (18 @ 20:18)  Auto Immature Granulocyte %: 0.3 % (18 @ 20:18)        137  |  109  |  20  ----------------------------<  187<H>  4.3   |  19  |  1.4      Calcium, Total Serum: 8.8 mg/dL (18 @ 20:18)  Magnesium, Serum: 2.0 mg/dL (18 @ 20:18)      LFTs:     Blood Gas Arterial, Lactate: 1.3 mmoL/L (18 @ 20:00)    ABG - ( 05 Mar 2018 20:00 )  pH: 7.32  /  pCO2: 38    /  pO2: 145   / HCO3: 19    / Base Excess: -6.2  /  SaO2: 99                Coags:              --------------------------------------------------------------------------------------    OTHER LABS    IMAGING RESULTS

## 2018-03-06 LAB
ANION GAP SERPL CALC-SCNC: 5 MMOL/L — LOW (ref 7–14)
ANION GAP SERPL CALC-SCNC: 7 MMOL/L — SIGNIFICANT CHANGE UP (ref 7–14)
BUN SERPL-MCNC: 18 MG/DL — SIGNIFICANT CHANGE UP (ref 10–20)
BUN SERPL-MCNC: 19 MG/DL — SIGNIFICANT CHANGE UP (ref 10–20)
CALCIUM SERPL-MCNC: 9 MG/DL — SIGNIFICANT CHANGE UP (ref 8.5–10.1)
CALCIUM SERPL-MCNC: 9 MG/DL — SIGNIFICANT CHANGE UP (ref 8.5–10.1)
CHLORIDE SERPL-SCNC: 112 MMOL/L — HIGH (ref 98–110)
CHLORIDE SERPL-SCNC: 112 MMOL/L — HIGH (ref 98–110)
CK MB BLD-MCNC: 2 % — SIGNIFICANT CHANGE UP (ref 0–4)
CK MB CFR SERPL CALC: 1.2 NG/ML — SIGNIFICANT CHANGE UP (ref 0.6–6.3)
CK MB CFR SERPL CALC: 1.5 NG/ML — SIGNIFICANT CHANGE UP (ref 0.6–6.3)
CK SERPL-CCNC: 249 U/L — HIGH (ref 0–225)
CK SERPL-CCNC: 99 U/L — SIGNIFICANT CHANGE UP (ref 0–225)
CO2 SERPL-SCNC: 17 MMOL/L — SIGNIFICANT CHANGE UP (ref 17–32)
CO2 SERPL-SCNC: 20 MMOL/L — SIGNIFICANT CHANGE UP (ref 17–32)
CREAT SERPL-MCNC: 1.4 MG/DL — SIGNIFICANT CHANGE UP (ref 0.7–1.5)
CREAT SERPL-MCNC: 1.4 MG/DL — SIGNIFICANT CHANGE UP (ref 0.7–1.5)
GAS PNL BLDA: SIGNIFICANT CHANGE UP
GLUCOSE SERPL-MCNC: 109 MG/DL — SIGNIFICANT CHANGE UP (ref 70–110)
GLUCOSE SERPL-MCNC: 120 MG/DL — HIGH (ref 70–110)
HCT VFR BLD CALC: 31.9 % — LOW (ref 37–47)
HGB BLD-MCNC: 10.8 G/DL — LOW (ref 12–16)
MAGNESIUM SERPL-MCNC: 2.4 MG/DL — SIGNIFICANT CHANGE UP (ref 1.8–2.4)
MCHC RBC-ENTMCNC: 31.8 PG — HIGH (ref 27–31)
MCHC RBC-ENTMCNC: 33.9 G/DL — SIGNIFICANT CHANGE UP (ref 32–37)
MCV RBC AUTO: 93.8 FL — SIGNIFICANT CHANGE UP (ref 81–99)
NRBC # BLD: 0 /100 WBCS — SIGNIFICANT CHANGE UP (ref 0–0)
PHOSPHATE SERPL-MCNC: 2.8 MG/DL — SIGNIFICANT CHANGE UP (ref 2.1–4.9)
PLATELET # BLD AUTO: 223 K/UL — SIGNIFICANT CHANGE UP (ref 130–400)
POTASSIUM SERPL-MCNC: 4.6 MMOL/L — SIGNIFICANT CHANGE UP (ref 3.5–5)
POTASSIUM SERPL-MCNC: 4.9 MMOL/L — SIGNIFICANT CHANGE UP (ref 3.5–5)
POTASSIUM SERPL-SCNC: 4.6 MMOL/L — SIGNIFICANT CHANGE UP (ref 3.5–5)
POTASSIUM SERPL-SCNC: 4.9 MMOL/L — SIGNIFICANT CHANGE UP (ref 3.5–5)
RBC # BLD: 3.4 M/UL — LOW (ref 4.2–5.4)
RBC # FLD: 13.7 % — SIGNIFICANT CHANGE UP (ref 11.5–14.5)
SODIUM SERPL-SCNC: 136 MMOL/L — SIGNIFICANT CHANGE UP (ref 135–146)
SODIUM SERPL-SCNC: 137 MMOL/L — SIGNIFICANT CHANGE UP (ref 135–146)
TROPONIN I SERPL-MCNC: <0.02 NG/ML — SIGNIFICANT CHANGE UP (ref 0–0.05)
TROPONIN I SERPL-MCNC: <0.02 NG/ML — SIGNIFICANT CHANGE UP (ref 0–0.05)
WBC # BLD: 15.14 K/UL — HIGH (ref 4.8–10.8)
WBC # FLD AUTO: 15.14 K/UL — HIGH (ref 4.8–10.8)

## 2018-03-06 RX ORDER — PANTOPRAZOLE SODIUM 20 MG/1
40 TABLET, DELAYED RELEASE ORAL DAILY
Qty: 0 | Refills: 0 | Status: DISCONTINUED | OUTPATIENT
Start: 2018-03-06 | End: 2018-03-09

## 2018-03-06 RX ORDER — METOPROLOL TARTRATE 50 MG
5 TABLET ORAL EVERY 6 HOURS
Qty: 0 | Refills: 0 | Status: DISCONTINUED | OUTPATIENT
Start: 2018-03-06 | End: 2018-03-08

## 2018-03-06 RX ORDER — HYDROMORPHONE HYDROCHLORIDE 2 MG/ML
1 INJECTION INTRAMUSCULAR; INTRAVENOUS; SUBCUTANEOUS ONCE
Qty: 0 | Refills: 0 | Status: DISCONTINUED | OUTPATIENT
Start: 2018-03-06 | End: 2018-03-06

## 2018-03-06 RX ORDER — ACETAMINOPHEN 500 MG
650 TABLET ORAL EVERY 6 HOURS
Qty: 0 | Refills: 0 | Status: DISCONTINUED | OUTPATIENT
Start: 2018-03-06 | End: 2018-03-06

## 2018-03-06 RX ORDER — ONDANSETRON 8 MG/1
4 TABLET, FILM COATED ORAL EVERY 6 HOURS
Qty: 0 | Refills: 0 | Status: DISCONTINUED | OUTPATIENT
Start: 2018-03-06 | End: 2018-03-12

## 2018-03-06 RX ORDER — NALOXONE HYDROCHLORIDE 4 MG/.1ML
0.1 SPRAY NASAL
Qty: 0 | Refills: 0 | Status: DISCONTINUED | OUTPATIENT
Start: 2018-03-06 | End: 2018-03-09

## 2018-03-06 RX ORDER — HYDROMORPHONE HYDROCHLORIDE 2 MG/ML
30 INJECTION INTRAMUSCULAR; INTRAVENOUS; SUBCUTANEOUS
Qty: 0 | Refills: 0 | Status: DISCONTINUED | OUTPATIENT
Start: 2018-03-06 | End: 2018-03-09

## 2018-03-06 RX ORDER — ACETAMINOPHEN 500 MG
650 TABLET ORAL EVERY 6 HOURS
Qty: 0 | Refills: 0 | Status: DISCONTINUED | OUTPATIENT
Start: 2018-03-06 | End: 2018-03-07

## 2018-03-06 RX ADMIN — HYDROMORPHONE HYDROCHLORIDE 30 MILLILITER(S): 2 INJECTION INTRAMUSCULAR; INTRAVENOUS; SUBCUTANEOUS at 18:53

## 2018-03-06 RX ADMIN — Medication 100 MILLIGRAM(S): at 06:27

## 2018-03-06 RX ADMIN — Medication 100 MILLIGRAM(S): at 21:52

## 2018-03-06 RX ADMIN — PANTOPRAZOLE SODIUM 40 MILLIGRAM(S): 20 TABLET, DELAYED RELEASE ORAL at 16:03

## 2018-03-06 RX ADMIN — DEXTROSE MONOHYDRATE, SODIUM CHLORIDE, AND POTASSIUM CHLORIDE 75 MILLILITER(S): 50; .745; 4.5 INJECTION, SOLUTION INTRAVENOUS at 12:30

## 2018-03-06 RX ADMIN — HYDROMORPHONE HYDROCHLORIDE 1 MILLIGRAM(S): 2 INJECTION INTRAMUSCULAR; INTRAVENOUS; SUBCUTANEOUS at 16:56

## 2018-03-06 RX ADMIN — Medication 100 MILLIGRAM(S): at 16:04

## 2018-03-06 RX ADMIN — MORPHINE SULFATE 30 MILLILITER(S): 50 CAPSULE, EXTENDED RELEASE ORAL at 12:30

## 2018-03-06 RX ADMIN — Medication 5 MILLIGRAM(S): at 12:29

## 2018-03-06 RX ADMIN — Medication 5 MILLIGRAM(S): at 06:26

## 2018-03-06 RX ADMIN — Medication 5 MILLIGRAM(S): at 18:55

## 2018-03-06 RX ADMIN — HYDROMORPHONE HYDROCHLORIDE 1 MILLIGRAM(S): 2 INJECTION INTRAMUSCULAR; INTRAVENOUS; SUBCUTANEOUS at 16:03

## 2018-03-06 NOTE — PROGRESS NOTE ADULT - SUBJECTIVE AND OBJECTIVE BOX
Admit Date: 3/5/18  OR Date: 3/5/18  ICU Date: 3/5/18    Indication for ICU: s/p aortobifemoral bypass for occluded distal aorta with claudication     PAST MEDICAL & SURGICAL HISTORY:  Myocardial infarction: -  Chronic kidney disease: stage III  H/O heart artery stent: lad  CAD (coronary artery disease)  Renal tubular acidosis  Lupus  Sjogren's disease  H/O  section  H/O cystoscopy: last being 3-18-16  H/O lithotripsy: eswl x5 last being 17    Allergies  sulfa drugs (Rash)    Home Medications:  Aspir 81: orally once a day (05 Mar 2018 10:16)  Brilinta (ticagrelor) 90 mg oral tablet: 1 tab(s) orally 2 times a day (05 Mar 2018 10:16)  Evoxac 30 mg oral capsule: orally 4 times a day (05 Mar 2018 10:16)  losartan 25 mg oral tablet: 1 tab(s) orally once a day (in the evening) (05 Mar 2018 10:16)  metoprolol succinate 25 mg oral tablet, extended release: 1 tab(s) orally once (at bedtime) (05 Mar 2018 10:16)  simvastatin 20 mg oral tablet: 1 tab(s) orally once a day (at bedtime) (05 Mar 2018 10:16)  traMADol: 75 milligram(s) orally once a day (05 Mar 2018 10:16)  Urocit-K 10 mEq oral tablet, extended release: 1 tab(s) orally 3 times a day (05 Mar 2018 10:16)      HPI/LAST 24 HOUR EVENTS:   46yo female with history of recent mi s/p pci found to have occluded  distal aorta with claudication, s/p aortobifemoral bypass, stable during procedure, received hydrazine 10mg before pacu for hypertension.  DP/PT palpable b/l and extremities warm.       Surgery Information  OR time:  5h    EBL:   300       IV Fluids:3600       UOP:   705       ROS:  [x ] A ten-point review of systems was otherwise negative except as noted. Admit Date: 3/5/18  OR Date: 3/5/18  ICU Date: 3/5/18    Indication for ICU: s/p aortobifemoral bypass for occluded distal aorta with claudication     PAST MEDICAL & SURGICAL HISTORY:  Myocardial infarction: -  Chronic kidney disease: stage III  H/O heart artery stent: lad  CAD (coronary artery disease)  Renal tubular acidosis  Lupus  Sjogren's disease  H/O  section  H/O cystoscopy: last being 3-18-16  H/O lithotripsy: eswl x5 last being 17    Allergies  sulfa drugs (Rash)    Home Medications:  Aspir 81: orally once a day (05 Mar 2018 10:16)  Brilinta (ticagrelor) 90 mg oral tablet: 1 tab(s) orally 2 times a day (05 Mar 2018 10:16)  Evoxac 30 mg oral capsule: orally 4 times a day (05 Mar 2018 10:16)  losartan 25 mg oral tablet: 1 tab(s) orally once a day (in the evening) (05 Mar 2018 10:16)  metoprolol succinate 25 mg oral tablet, extended release: 1 tab(s) orally once (at bedtime) (05 Mar 2018 10:16)  simvastatin 20 mg oral tablet: 1 tab(s) orally once a day (at bedtime) (05 Mar 2018 10:16)  traMADol: 75 milligram(s) orally once a day (05 Mar 2018 10:16)  Urocit-K 10 mEq oral tablet, extended release: 1 tab(s) orally 3 times a day (05 Mar 2018 10:16)      HPI/LAST 24 HOUR EVENTS:   46yo female with history of recent mi s/p pci found to have occluded  distal aorta with claudication, s/p aortobifemoral bypass, stable during procedure, received hydrazine 10mg before pacu for hypertension.  DP/PT palpable b/l and extremities warm.       Surgery Information  OR time:  5h    EBL:   300       IV Fluids:3600       UOP:   705       ROS:  [x ] A ten-point review of systems was otherwise negative except as noted.      HD: 1d  Daily Height in cm: 154.94 (05 Mar 2018 11:26)    Daily Weight in k (06 Mar 2018 04:00)    Diet, NPO:   With Hard Candy  With Ice Chips/Sips of Water (18 @ 19:03)      CURRENT MEDS:  Neurologic Medications  morphine PCA (1 mG/mL) 30 milliLiter(s) PCA Continuous PCA Continuous    Respiratory Medications    Cardiovascular Medications  metoprolol    tartrate Injectable 5 milliGRAM(s) IV Push every 6 hours    Gastrointestinal Medications  dextrose 5% + sodium chloride 0.45% with potassium chloride 20 mEq/L 1000 milliLiter(s) IV Continuous <Continuous>    Genitourinary Medications    Hematologic/Oncologic Medications    Antimicrobial/Immunologic Medications  ceFAZolin   IVPB 1000 milliGRAM(s) IV Intermittent every 8 hours  ceFAZolin   IVPB        Endocrine/Metabolic Medications    Topical/Other Medications      ICU Vital Signs Last 24 Hrs  T(C): 37.8 (06 Mar 2018 04:00), Max: 37.8 (06 Mar 2018 04:00)  T(F): 100 (06 Mar 2018 04:00), Max: 100 (06 Mar 2018 04:00)  HR: 82 (06 Mar 2018 05:00) (69 - 98)  BP: 132/70 (06 Mar 2018 05:00) (92/59 - 146/68)  BP(mean): 90 (06 Mar 2018 05:00) (84 - 103)  ABP: 138/62 (06 Mar 2018 05:00) (134/62 - 160/72)  ABP(mean): 84 (06 Mar 2018 05:00) (84 - 100)  RR: 14 (06 Mar 2018 05:00) (11 - 18)  SpO2: 94% (06 Mar 2018 05:00) (93% - 100%)      Adult Advanced Hemodynamics Last 24 Hrs  CVP(mm Hg): --  CVP(cm H2O): --  CO: --  CI: --  PA: --  PA(mean): --  PCWP: --  SVR: --  SVRI: --  PVR: --  PVRI: --      ABG - ( 05 Mar 2018 20:00 )  pH: 7.32  /  pCO2: 38    /  pO2: 145   / HCO3: 19    / Base Excess: -6.2  /  SaO2: 99                  I&O's Summary    05 Mar 2018 07:01  -  06 Mar 2018 05:44  --------------------------------------------------------  IN: 870 mL / OUT: 1130 mL / NET: -260 mL      I&O's Detail    05 Mar 2018 07:01  -  06 Mar 2018 05:44  --------------------------------------------------------  IN:    dextrose 5% + sodium chloride 0.45% with potassium chloride 20 mEq/L: 600 mL    IV PiggyBack: 50 mL    lactated ringers.: 220 mL  Total IN: 870 mL    OUT:    Indwelling Catheter - Urethral: 1120 mL    Nasoenteral Tube: 10 mL  Total OUT: 1130 mL    Total NET: -260 mL      uop /hr    PHYSICAL EXAM:    General/Neuro  RASS:             GCS:  15     Deficits:                             alert & oriented x 3, no focal deficits  Pupils: perrla    Lungs:      clear to auscultation, Normal expansion/effort.     Cardiovascular : S1, S2.  Regular rate and rhythm.  Peripheral edema   Cardiac Rhythm: Normal Sinus Rhythm    GI: Abdomen soft, Non-tender, Non-distended.  c/o back pain  Nasogastric tube in place.    Wound: abdominal and bilateral groin incisions clean, dry, no hematoma    Extremities: Extremities warm, pink, well-perfused. Pulses:Rt  palp pt/dp   Lt palp pt dop dp    Derm: Good skin turgor, no skin breakdown.      :       Jackson catheter in place.      CXR:     LABS:    Labs:  CAPILLARY BLOOD GLUCOSE                              11.5   18.25 )-----------( 237      ( 05 Mar 2018 20:18 )             34.2       Auto Neutrophil %: 88.2 % (18 @ 20:18)  Auto Immature Granulocyte %: 0.3 % (18 @ 20:18)        137  |  109  |  20  ----------------------------<  187<H>  4.3   |  19  |  1.4      Calcium, Total Serum: 8.8 mg/dL (18 @ 20:18)  Magnesium, Serum: 2.0 mg/dL (18 @ 20:18)      LFTs:     Blood Gas Arterial, Lactate: 1.3 mmoL/L (18 @ 20:00)    ABG - ( 05 Mar 2018 20:00 )  pH: 7.32  /  pCO2: 38    /  pO2: 145   / HCO3: 19    / Base Excess: -6.2  /  SaO2: 99                Coags:    CARDIAC MARKERS ( 05 Mar 2018 22:46 )  <0.02 ng/mL / x     / 99 U/L / x     / 1.5 ng/mL

## 2018-03-06 NOTE — PROGRESS NOTE ADULT - SUBJECTIVE AND OBJECTIVE BOX
S/P Aortobifem 3/    PAST MEDICAL & SURGICAL HISTORY:  Myocardial infarction:   Chronic kidney disease: stage III  H/O heart artery stent: lad  CAD (coronary artery disease)  Renal tubular acidosis  Lupus  Sjogren's disease  H/O  section  H/O cystoscopy: last being 3-18-16  H/O lithotripsy: eswl x5 last being 17    Vital Signs Last 24 Hrs  T(C): 37.8 (06 Mar 2018 04:00), Max: 37.8 (06 Mar 2018 04:00)  T(F): 100 (06 Mar 2018 04:00), Max: 100 (06 Mar 2018 04:00)  HR: 80 (06 Mar 2018 06:00) (69 - 98)  BP: 132/70 (06 Mar 2018 05:00) (92/59 - 146/68)  BP(mean): 90 (06 Mar 2018 05:00) (84 - 103)  RR: 11 (06 Mar 2018 06:00) (11 - 18)  SpO2: 94% (06 Mar 2018 06:00) (93% - 100%)    I&O's Detail    05 Mar 2018 07:01  -  06 Mar 2018 06:45  --------------------------------------------------------  IN:    dextrose 5% + sodium chloride 0.45% with potassium chloride 20 mEq/L: 675 mL    Enteral Tube Flush: 40 mL    IV PiggyBack: 100 mL    lactated ringers.: 220 mL  Total IN: 1035 mL    OUT:    Indwelling Catheter - Urethral: 1235 mL    Nasoenteral Tube: 50 mL  Total OUT: 1285 mL    Total NET: -250 mL               11.5   18.25 )-----------( 237      (  @ 20:18 )             34.2                  137   |  109   |  20                 Ca: 8.8    BMP:   ----------------------------< 187    M.0   (18 @ 20:18)             4.3    |  19    | 1.4                Ph: x          CARDIAC MARKERS ( 05 Mar 2018 22:46 )  <0.02 ng/mL / x     / 99 U/L / x     / 1.5 ng/mL    ABG - ( 05 Mar 2018 20:00 )  pH: 7.32  /  pCO2: 38    /  pO2: 145   / HCO3: 19    / Base Excess: -6.2  /  SaO2: 99          MEDICATIONS  (STANDING):  ceFAZolin   IVPB 1000 milliGRAM(s) IV Intermittent every 8 hours  ceFAZolin   IVPB      dextrose 5% + sodium chloride 0.45% with potassium chloride 20 mEq/L 1000 milliLiter(s) (75 mL/Hr) IV Continuous <Continuous>  metoprolol    tartrate Injectable 5 milliGRAM(s) IV Push every 6 hours  morphine PCA (1 mG/mL) 30 milliLiter(s) PCA Continuous PCA Continuous    Diet, NPO:   With Hard Candy  With Ice Chips/Sips of Water (18 @ 19:03)    PHYSICAL EXAM:  General Appearance: NAD, NG tube in place  Neck: Supple  Chest: Equal expansion bilaterally, equal breath sounds  CV: S1, S2, RRR  Abdomen: Soft, ND, tender at incisions  Extremities: Grossly symmetric, WNL. DP palpable bilat, PT dopplerable.   Neuro: A&Ox3  Incisions: Dry, dressing intact

## 2018-03-06 NOTE — PROGRESS NOTE ADULT - ASSESSMENT
ASSESSMENT/PLAN: 47y Female s/p aortobifemoral bypass for occluded distal aorta with claudication        Neurologic: GCS 15, on Morphine PCA pump for pain    Respiratory: Incentive spirometer, keep bedrest for now    Cardiovascular: recent h/o of MI and PCI/stent (on ASA/Brillinta), 1st set of CE negative, f/up 2 mores sets.    Gastrointestinal/Nutrition: keep NPO with NGT in place, D51/2NS @ 75/hr    Genitourinary/Renal: barroso in place, strict I and Os    Hematologic: h/h stable    Infectious Disease: received Ancef tony-op    Endocrine: no endo issues    Disposition: cont SICU care ASSESSMENT/PLAN: 47y Female s/p aortobifemoral bypass for occluded distal aorta with claudication     Neurologic: GCS 15, on Morphine PCA pump for pain    Respiratory: cta b/l  Incentive spirometer, keep bedrest for now    Cardiovascular: recent h/o of MI and PCI/stent  (on ASA/Brillinta), 1st set of CE negative, f/up 2 mores sets.   continue metoprolol iv , hold asa/brilinta     Gastrointestinal/Nutrition: keep NPO with NGT in place, D51/2NS @ 75/hr    Genitourinary/Renal:  renal tubular acidosis, ckd4 making good urine barroso in place, strict I and Os  continue ivf, trend cr 1.6 preop > 1.4 post op    Hematologic: pad lupus, sjogren syndrome, dopplerable pulses bilaterally hold heparin sq, no scds per vascular team  h/h stable    Infectious Disease: received Ancef tony-op    Lines/Tubes: piv, r rad coty, barroso    Endocrine: no endo issues    Disposition: cont SICU care

## 2018-03-07 LAB
ANION GAP SERPL CALC-SCNC: 3 MMOL/L — LOW (ref 7–14)
APTT BLD: 24.1 SEC — LOW (ref 27–39.2)
BUN SERPL-MCNC: 20 MG/DL — SIGNIFICANT CHANGE UP (ref 10–20)
CALCIUM SERPL-MCNC: 8.9 MG/DL — SIGNIFICANT CHANGE UP (ref 8.5–10.1)
CHLORIDE SERPL-SCNC: 115 MMOL/L — HIGH (ref 98–110)
CO2 SERPL-SCNC: 20 MMOL/L — SIGNIFICANT CHANGE UP (ref 17–32)
CREAT SERPL-MCNC: 1.3 MG/DL — SIGNIFICANT CHANGE UP (ref 0.7–1.5)
GLUCOSE SERPL-MCNC: 121 MG/DL — HIGH (ref 70–110)
HCT VFR BLD CALC: 31.6 % — LOW (ref 37–47)
HGB BLD-MCNC: 10.4 G/DL — LOW (ref 12–16)
INR BLD: 1.09 RATIO — SIGNIFICANT CHANGE UP (ref 0.65–1.3)
MAGNESIUM SERPL-MCNC: 2.2 MG/DL — SIGNIFICANT CHANGE UP (ref 1.8–2.4)
MCHC RBC-ENTMCNC: 31 PG — SIGNIFICANT CHANGE UP (ref 27–31)
MCHC RBC-ENTMCNC: 32.9 G/DL — SIGNIFICANT CHANGE UP (ref 32–37)
MCV RBC AUTO: 94.3 FL — SIGNIFICANT CHANGE UP (ref 81–99)
NRBC # BLD: 0 /100 WBCS — SIGNIFICANT CHANGE UP (ref 0–0)
PHOSPHATE SERPL-MCNC: 2.8 MG/DL — SIGNIFICANT CHANGE UP (ref 2.1–4.9)
PLATELET # BLD AUTO: 208 K/UL — SIGNIFICANT CHANGE UP (ref 130–400)
POTASSIUM SERPL-MCNC: 4.7 MMOL/L — SIGNIFICANT CHANGE UP (ref 3.5–5)
POTASSIUM SERPL-SCNC: 4.7 MMOL/L — SIGNIFICANT CHANGE UP (ref 3.5–5)
PROTHROM AB SERPL-ACNC: 11.8 SEC — SIGNIFICANT CHANGE UP (ref 9.95–12.87)
RBC # BLD: 3.35 M/UL — LOW (ref 4.2–5.4)
RBC # FLD: 13.7 % — SIGNIFICANT CHANGE UP (ref 11.5–14.5)
SODIUM SERPL-SCNC: 138 MMOL/L — SIGNIFICANT CHANGE UP (ref 135–146)
WBC # BLD: 15.02 K/UL — HIGH (ref 4.8–10.8)
WBC # FLD AUTO: 15.02 K/UL — HIGH (ref 4.8–10.8)

## 2018-03-07 RX ORDER — LOSARTAN POTASSIUM 100 MG/1
25 TABLET, FILM COATED ORAL DAILY
Qty: 0 | Refills: 0 | Status: DISCONTINUED | OUTPATIENT
Start: 2018-03-07 | End: 2018-03-12

## 2018-03-07 RX ORDER — SODIUM CHLORIDE 9 MG/ML
1000 INJECTION, SOLUTION INTRAVENOUS
Qty: 0 | Refills: 0 | Status: DISCONTINUED | OUTPATIENT
Start: 2018-03-07 | End: 2018-03-11

## 2018-03-07 RX ADMIN — Medication 62.5 MILLIMOLE(S): at 03:12

## 2018-03-07 RX ADMIN — SODIUM CHLORIDE 75 MILLILITER(S): 9 INJECTION, SOLUTION INTRAVENOUS at 21:50

## 2018-03-07 RX ADMIN — Medication 5 MILLIGRAM(S): at 06:31

## 2018-03-07 RX ADMIN — Medication 5 MILLIGRAM(S): at 01:03

## 2018-03-07 NOTE — CHART NOTE - NSCHARTNOTEFT_GEN_A_CORE
Pt is POD# 2 S/P Aortobifemoral bypass   Doing well.  Remains NPO with NGT in place on low intermittent suction secondary to blood tinged output.  On Protonix for GI prophylaxis.  Pain well controlled on Dilaudid PCA.  Distal pulses remain palpable though right>left.  Started pt on home Losartan PO.  Remains on B-blocker IV.   Need to address restarting ASA/Brillinta and Heparin SQ.  Pt hemodynamically stable for transfer to floor.  Sign out given to  Pt is POD# 2 S/P Aortobifemoral bypass   Doing well.  Remains NPO with NGT in place on low intermittent suction secondary to blood tinged output.  On Protonix for GI prophylaxis.  Pain well controlled on Dilaudid PCA.  Distal pulses remain palpable though right>left.  Started pt on home Losartan PO.  Remains on B-blocker IV.   Need to address restarting ASA/Brillinta and Heparin SQ.  Pt hemodynamically stable for transfer to floor.  Sign out given to Dr Chan Aguilar.

## 2018-03-07 NOTE — PROGRESS NOTE ADULT - ATTENDING COMMENTS
Assessment and plan above were discussed with me over the phone. I was not present for examination.
Assessment and plan above were modified and discussed with residents, physician assistants, and nurses.  I have provided 25  min of Critical Care to this patient.

## 2018-03-07 NOTE — PROGRESS NOTE ADULT - SUBJECTIVE AND OBJECTIVE BOX
Admit Date: 3/5/18  OR Date: 3/5/18  ICU Date: 3/5/18    Indication for ICU: s/p aortobifemoral bypass occluded  distal aorta with claudication        24 HOUR EVENTS:   ·	Continued Bed rest, NG tube in place  ·	Pulses remain stable with palpable B/L though right stronger than left.  All dopplerable.    ·	Switched to Dilaudid PCA   ·	Per Vascular> no ASA or NSAIDS.          PAST MEDICAL & SURGICAL HISTORY:  Myocardial infarction:   CKD- III  H/O heart artery stent: lad  CAD   Renal tubular acidosis  Lupus  Sjogren's disease  H/O  section  H/O cystoscopy: last being 3-18-16  H/O lithotripsy: eswl x5 last being 17    Allergies  sulfa drugs (Rash)    Home Medications:  Aspir 81: orally once a day (05 Mar 2018 10:16)  Brilinta (ticagrelor) 90 mg oral tablet: 1 tab(s) orally 2 times a day (05 Mar 2018 10:16)  Evoxac 30 mg oral capsule: orally 4 times a day (05 Mar 2018 10:16)  losartan 25 mg oral tablet: 1 tab(s) orally once a day (in the evening) (05 Mar 2018 10:16)  metoprolol succinate 25 mg oral tablet, extended release: 1 tab(s) orally once (at bedtime) (05 Mar 2018 10:16)  simvastatin 20 mg oral tablet: 1 tab(s) orally once a day (at bedtime) (05 Mar 2018 10:16)  traMADol: 75 milligram(s) orally once a day (05 Mar 2018 10:16)  Urocit-K 10 mEq oral tablet, extended release: 1 tab(s) orally 3 times a day (05 Mar 2018 10:16)      ROS: Admit Date: 3/5/18  OR Date: 3/5/18  ICU Date: 3/5/18    Indication for ICU: s/p aortobifemoral bypass occluded  distal aorta with claudication        24 HOUR EVENTS:   ·	Continued Bed rest, NG tube in place  ·	Pulses remain stable with palpable B/L though right stronger than left.  All dopplerable.    ·	Switched to Dilaudid PCA   ·	Per Vascular> no ASA or NSAIDS.          PAST MEDICAL & SURGICAL HISTORY:  Myocardial infarction:   CKD- III  H/O heart artery stent: lad  CAD   Renal tubular acidosis  Lupus  Sjogren's disease  H/O  section  H/O cystoscopy: last being 3-18-16  H/O lithotripsy: eswl x5 last being 17    Allergies  sulfa drugs (Rash)    Home Medications:  Aspir 81: orally once a day (05 Mar 2018 10:16)  Brilinta (ticagrelor) 90 mg oral tablet: 1 tab(s) orally 2 times a day (05 Mar 2018 10:16)  Evoxac 30 mg oral capsule: orally 4 times a day (05 Mar 2018 10:16)  losartan 25 mg oral tablet: 1 tab(s) orally once a day (in the evening) (05 Mar 2018 10:16)  metoprolol succinate 25 mg oral tablet, extended release: 1 tab(s) orally once (at bedtime) (05 Mar 2018 10:16)  simvastatin 20 mg oral tablet: 1 tab(s) orally once a day (at bedtime) (05 Mar 2018 10:16)  traMADol: 75 milligram(s) orally once a day (05 Mar 2018 10:16)  Urocit-K 10 mEq oral tablet, extended release: 1 tab(s) orally 3 times a day (05 Mar 2018 10:16)      ROS:    Daily     Daily     Diet, NPO:   With Hard Candy  With Ice Chips/Sips of Water (18 @ 19:03)      CURRENT MEDS:  Neurologic Medications  acetaminophen  Suppository 650 milliGRAM(s) Rectal every 6 hours  HYDROmorphone PCA (1 mG/mL) 30 milliLiter(s) PCA Continuous PCA Continuous  ondansetron Injectable 4 milliGRAM(s) IV Push every 6 hours PRN Nausea    Respiratory Medications    Cardiovascular Medications  metoprolol    tartrate Injectable 5 milliGRAM(s) IV Push every 6 hours    Gastrointestinal Medications  dextrose 5% + sodium chloride 0.45% with potassium chloride 20 mEq/L 1000 milliLiter(s) IV Continuous <Continuous>  pantoprazole  Injectable 40 milliGRAM(s) IV Push daily    Genitourinary Medications    Hematologic/Oncologic Medications    Antimicrobial/Immunologic Medications    Endocrine/Metabolic Medications    Topical/Other Medications  naloxone Injectable 0.1 milliGRAM(s) IV Push every 3 minutes PRN For ANY of the following changes in patient status:  A. RR LESS THAN 10 breaths per minute, B. Oxygen saturation LESS THAN 90%, C. Sedation score of 6      ICU Vital Signs Last 24 Hrs  T(C): 37.3 (07 Mar 2018 04:00), Max: 37.7 (06 Mar 2018 20:00)  T(F): 99.1 (07 Mar 2018 04:00), Max: 99.9 (06 Mar 2018 20:00)  HR: 90 (07 Mar 2018 07:00) (78 - 124)  BP: 116/70 (06 Mar 2018 20:30) (112/63 - 118/63)  BP(mean): 86 (06 Mar 2018 13:00) (82 - 86)  ABP: 122/66 (07 Mar 2018 07:00) (110/56 - 146/70)  ABP(mean): 86 (07 Mar 2018 07:00) (74 - 98)  RR: 14 (07 Mar 2018 07:00) (8 - 18)  SpO2: 98% (07 Mar 2018 07:00) (92% - 100%)        ABG - ( 06 Mar 2018 15:50 )  pH: 7.37  /  pCO2: 36    /  pO2: 58    / HCO3: 21    / Base Excess: -3.8  /  SaO2: 90                  I&O's Summary    06 Mar 2018 07:01  -  07 Mar 2018 07:00  --------------------------------------------------------  IN: 1785 mL / OUT: 2820 mL / NET: -1035 mL      I&O's Detail    06 Mar 2018 07:01  -  07 Mar 2018 07:00  --------------------------------------------------------  IN:    dextrose 5% + sodium chloride 0.45% with potassium chloride 20 mEq/L: 1425 mL    Enteral Tube Flush: 60 mL    IV PiggyBack: 300 mL  Total IN: 1785 mL    OUT:    Indwelling Catheter - Urethral: 2470 mL    Nasoenteral Tube: 350 mL  Total OUT: 2820 mL    Total NET: -1035 mL            PHYSICAL EXAM:    NEURO:  GCS:      15      E  4   /V   5  /M  6    /           Sedation ( x   ) No  (    ) Yes    Exam: awake, answering questions.  trying to do IS.  Reports pain better controlled after PCA switched to Dilaudid.     CHEST/LUNG:  Vent (    ) No  ( x   ) Yes   Room air (   )   Nasal canula (x   ) __2__ liters   Breath sounds:  equal b/l, no wheezes, no crackles    HEART:  S1/S2 ( x   )    NSR (   x )    Sinus tachycardia (   )    Arrythmia (    ) No (    ) Yes  Type:     ABDOMEN:   Distended (  x  ) No  (    ) Yes      Tenderness (    ) No (   x ) Yes    (    ) N/A    BS (    ) No (  x  ) Yes   sluggish     midline dressing intact with some staining, b/l groin dressings also intact, no evidence of hematoma, tender to to touch            EXTREMITIES:  Edema (  x  ) No (    ) Yes           Calf tenderness ( x   ) No (    ) Yes  (    ) N/A    Pulses:  palpable (    ) No (  x  ) Yes    Doppler (    ) No (    ) Yes  right DP stronger than left DP, warm b/l            CXR:     LABS:  Labs:  CAPILLARY BLOOD GLUCOSE                              10.4   15.02 )-----------( 208      ( 07 Mar 2018 00:06 )             31.6             138  |  115<H>  |  20  ----------------------------<  121<H>  4.7   |  20  |  1.3      Calcium, Total Serum: 8.9 mg/dL (18 @ 00:06)  Magnesium, Serum: 2.2 mg/dL (18 @ 00:06)  Phosphorus Level, Serum: 2.8 mg/dL (18 @ 00:06)      LFTs:     Blood Gas Arterial, Lactate: 0.5 mmoL/L (18 @ 15:50)  Blood Gas Arterial, Lactate: 1.3 mmoL/L (18 @ 20:00)    ABG - ( 06 Mar 2018 15:50 )  pH: 7.37  /  pCO2: 36    /  pO2: 58    / HCO3: 21    / Base Excess: -3.8  /  SaO2: 90              ABG - ( 05 Mar 2018 20:00 )  pH: 7.32  /  pCO2: 38    /  pO2: 145   / HCO3: 19    / Base Excess: -6.2  /  SaO2: 99                Coags:     11.80  ----< 1.09    ( 07 Mar 2018 00:06 )     24.1        CARDIAC MARKERS ( 06 Mar 2018 17:00 )  <0.02 ng/mL / x     / 249 U/L / x     / 1.2 ng/mL  CARDIAC MARKERS ( 05 Mar 2018 22:46 )  <0.02 ng/mL / x     / 99 U/L / x     / 1.5 ng/mL Admit Date: 3/5/18  OR Date: 3/5/18  ICU Date: 3/5/18    Indication for ICU: s/p aortobifemoral bypass occluded  distal aorta with claudication        24 HOUR EVENTS:   ·	Continued Bed rest, NG tube in place  ·	Pulses remain stable with palpable B/L though right stronger than left.  All dopplerable.    ·	Switched to Dilaudid PCA   ·	Per Vascular> no ASA or NSAIDS.          PAST MEDICAL & SURGICAL HISTORY:  Myocardial infarction:   CKD- III  H/O heart artery stent: lad  CAD   Renal tubular acidosis  Lupus  Sjogren's disease  H/O  section  H/O cystoscopy: last being 3-18-16  H/O lithotripsy: eswl x5 last being 17    Allergies  sulfa drugs (Rash)    Home Medications:  Aspir 81: orally once a day (05 Mar 2018 10:16)  Brilinta (ticagrelor) 90 mg oral tablet: 1 tab(s) orally 2 times a day (05 Mar 2018 10:16)  Evoxac 30 mg oral capsule: orally 4 times a day (05 Mar 2018 10:16)  losartan 25 mg oral tablet: 1 tab(s) orally once a day (in the evening) (05 Mar 2018 10:16)  metoprolol succinate 25 mg oral tablet, extended release: 1 tab(s) orally once (at bedtime) (05 Mar 2018 10:16)  simvastatin 20 mg oral tablet: 1 tab(s) orally once a day (at bedtime) (05 Mar 2018 10:16)  traMADol: 75 milligram(s) orally once a day (05 Mar 2018 10:16)  Urocit-K 10 mEq oral tablet, extended release: 1 tab(s) orally 3 times a day (05 Mar 2018 10:16)      ROS:    Daily     Daily     Diet, NPO:   With Hard Candy  With Ice Chips/Sips of Water (18 @ 19:03)      CURRENT MEDS:  Neurologic Medications  acetaminophen  Suppository 650 milliGRAM(s) Rectal every 6 hours  HYDROmorphone PCA (1 mG/mL) 30 milliLiter(s) PCA Continuous PCA Continuous  ondansetron Injectable 4 milliGRAM(s) IV Push every 6 hours PRN Nausea    Respiratory Medications    Cardiovascular Medications  metoprolol    tartrate Injectable 5 milliGRAM(s) IV Push every 6 hours    Gastrointestinal Medications  dextrose 5% + sodium chloride 0.45% with potassium chloride 20 mEq/L 1000 milliLiter(s) IV Continuous <Continuous>  pantoprazole  Injectable 40 milliGRAM(s) IV Push daily    Genitourinary Medications    Hematologic/Oncologic Medications    Antimicrobial/Immunologic Medications    Endocrine/Metabolic Medications    Topical/Other Medications  naloxone Injectable 0.1 milliGRAM(s) IV Push every 3 minutes PRN For ANY of the following changes in patient status:  A. RR LESS THAN 10 breaths per minute, B. Oxygen saturation LESS THAN 90%, C. Sedation score of 6      ICU Vital Signs Last 24 Hrs  T(C): 37.3 (07 Mar 2018 04:00), Max: 37.7 (06 Mar 2018 20:00)  T(F): 99.1 (07 Mar 2018 04:00), Max: 99.9 (06 Mar 2018 20:00)  HR: 90 (07 Mar 2018 07:00) (78 - 124)  BP: 116/70 (06 Mar 2018 20:30) (112/63 - 118/63)  BP(mean): 86 (06 Mar 2018 13:00) (82 - 86)  ABP: 122/66 (07 Mar 2018 07:00) (110/56 - 146/70)  ABP(mean): 86 (07 Mar 2018 07:00) (74 - 98)  RR: 14 (07 Mar 2018 07:00) (8 - 18)  SpO2: 98% (07 Mar 2018 07:00) (92% - 100%)        ABG - ( 06 Mar 2018 15:50 )  pH: 7.37  /  pCO2: 36    /  pO2: 58    / HCO3: 21    / Base Excess: -3.8  /  SaO2: 90                  I&O's Summary    06 Mar 2018 07:01  -  07 Mar 2018 07:00  --------------------------------------------------------  IN: 1785 mL / OUT: 2820 mL / NET: -1035 mL      I&O's Detail    06 Mar 2018 07:01  -  07 Mar 2018 07:00  --------------------------------------------------------  IN:    dextrose 5% + sodium chloride 0.45% with potassium chloride 20 mEq/L: 1425 mL    Enteral Tube Flush: 60 mL    IV PiggyBack: 300 mL  Total IN: 1785 mL    OUT:    Indwelling Catheter - Urethral: 2470 mL    Nasoenteral Tube: 350 mL  Total OUT: 2820 mL    Total NET: -1035 mL            PHYSICAL EXAM:    NEURO:  GCS:      15      E  4   /V   5  /M  6    /           Sedation ( x   ) No  (    ) Yes    Exam: awake, answering questions.  trying to do IS.  Reports pain better controlled after PCA switched to Dilaudid.     CHEST/LUNG:  Vent (  x  ) No  ( ) Yes   Room air (   )   Nasal canula (x   ) __2__ liters   Breath sounds:  equal b/l, no wheezes, no crackles    HEART:  S1/S2 ( x   )    NSR (   x )    Sinus tachycardia (   )    Arrythmia (    ) No (    ) Yes  Type:     ABDOMEN:   Distended (  x  ) No  (    ) Yes      Tenderness (    ) No (   x ) Yes    (    ) N/A    BS (    ) No (  x  ) Yes   sluggish     midline dressing intact with some staining, b/l groin dressings also intact, no evidence of hematoma, tender to to touch            EXTREMITIES:  Edema (  x  ) No (    ) Yes           Calf tenderness ( x   ) No (    ) Yes  (    ) N/A    Pulses:  palpable (    ) No (  x  ) Yes    Doppler (    ) No (    ) Yes  right DP stronger than left DP, warm b/l            CXR:     LABS:  Labs:  CAPILLARY BLOOD GLUCOSE                              10.4   15.02 )-----------( 208      ( 07 Mar 2018 00:06 )             31.6             138  |  115<H>  |  20  ----------------------------<  121<H>  4.7   |  20  |  1.3      Calcium, Total Serum: 8.9 mg/dL (18 @ 00:06)  Magnesium, Serum: 2.2 mg/dL (18 @ 00:06)  Phosphorus Level, Serum: 2.8 mg/dL (18 @ 00:06)      LFTs:     Blood Gas Arterial, Lactate: 0.5 mmoL/L (18 @ 15:50)  Blood Gas Arterial, Lactate: 1.3 mmoL/L (18 @ 20:00)    ABG - ( 06 Mar 2018 15:50 )  pH: 7.37  /  pCO2: 36    /  pO2: 58    / HCO3: 21    / Base Excess: -3.8  /  SaO2: 90              ABG - ( 05 Mar 2018 20:00 )  pH: 7.32  /  pCO2: 38    /  pO2: 145   / HCO3: 19    / Base Excess: -6.2  /  SaO2: 99                Coags:     11.80  ----< 1.09    ( 07 Mar 2018 00:06 )     24.1        CARDIAC MARKERS ( 06 Mar 2018 17:00 )  <0.02 ng/mL / x     / 249 U/L / x     / 1.2 ng/mL  CARDIAC MARKERS ( 05 Mar 2018 22:46 )  <0.02 ng/mL / x     / 99 U/L / x     / 1.5 ng/mL

## 2018-03-07 NOTE — PROGRESS NOTE ADULT - ASSESSMENT
Continue SICU care and monitoring. Switched to dilaudid PCA and better pain controlled. 200 cc NG tube output. Dr Singletary to decide if ok to remove NG tube.

## 2018-03-07 NOTE — PROGRESS NOTE ADULT - SUBJECTIVE AND OBJECTIVE BOX
S/P Aortobifem bypass 3/5    PAST MEDICAL & SURGICAL HISTORY:  Myocardial infarction:   Chronic kidney disease: stage III  H/O heart artery stent: lad  CAD (coronary artery disease)  Renal tubular acidosis  Lupus  Sjogren's disease  H/O  section  H/O cystoscopy: last being 3-16  H/O lithotripsy: eswl x5 last being 17    Vital Signs Last 24 Hrs  T(C): 37.3 (07 Mar 2018 04:00), Max: 37.7 (06 Mar 2018 20:00)  T(F): 99.1 (07 Mar 2018 04:00), Max: 99.9 (06 Mar 2018 20:00)  HR: 94 (07 Mar 2018 05:30) (78 - 124)  BP: 116/70 (06 Mar 2018 20:30) (112/63 - 118/65)  BP(mean): 86 (06 Mar 2018 13:00) (82 - 87)  RR: 11 (07 Mar 2018 05:30) (8 - 18)  SpO2: 98% (07 Mar 2018 05:30) (92% - 100%)    I&O's Detail    05 Mar 2018 07:01  -  06 Mar 2018 07:00  --------------------------------------------------------  IN:    dextrose 5% + sodium chloride 0.45% with potassium chloride 20 mEq/L: 675 mL    Enteral Tube Flush: 40 mL    IV PiggyBack: 100 mL    lactated ringers.: 220 mL  Total IN: 1035 mL    OUT:    Indwelling Catheter - Urethral: 1330 mL    Nasoenteral Tube: 50 mL  Total OUT: 1380 mL    Total NET: -345 mL      06 Mar 2018 07:01  -  07 Mar 2018 06:30  --------------------------------------------------------  IN:    dextrose 5% + sodium chloride 0.45% with potassium chloride 20 mEq/L: 1200 mL    Enteral Tube Flush: 60 mL    IV PiggyBack: 300 mL  Total IN: 1560 mL    OUT:    Indwelling Catheter - Urethral: 2070 mL    Nasoenteral Tube: 200 mL  Total OUT: 2270 mL    Total NET: -710 mL               10.4   15.02 )-----------( 208      (  @ 00:06 )             31.6                10.8   15.14 )-----------( 223      (  @ 16:23 )             31.9                11.5   18.25 )-----------( 237      (  @ 20:18 )             34.2                    138   |  115   |  20                 Ca: 8.9    BMP:   ----------------------------< 121    M.2   (18 @ 00:06)             4.7    |  20    | 1.3                Ph: 2.8        PT/INR - ( 07 Mar 2018 00:06 )   PT: 11.80 sec;   INR: 1.09 ratio       PTT - ( 07 Mar 2018 00:06 )  PTT:24.1 sec    CARDIAC MARKERS ( 06 Mar 2018 17:00 )  <0.02 ng/mL / x     / 249 U/L / x     / 1.2 ng/mL  CARDIAC MARKERS ( 05 Mar 2018 22:46 )  <0.02 ng/mL / x     / 99 U/L / x     / 1.5 ng/mL      ABG - ( 06 Mar 2018 15:50 )  pH: 7.37  /  pCO2: 36    /  pO2: 58    / HCO3: 21    / Base Excess: -3.8  /  SaO2: 90          MEDICATIONS  (STANDING):  acetaminophen  Suppository 650 milliGRAM(s) Rectal every 6 hours  dextrose 5% + sodium chloride 0.45% with potassium chloride 20 mEq/L 1000 milliLiter(s) (75 mL/Hr) IV Continuous <Continuous>  HYDROmorphone PCA (1 mG/mL) 30 milliLiter(s) PCA Continuous PCA Continuous  metoprolol    tartrate Injectable 5 milliGRAM(s) IV Push every 6 hours  pantoprazole  Injectable 40 milliGRAM(s) IV Push daily    MEDICATIONS  (PRN):  naloxone Injectable 0.1 milliGRAM(s) IV Push every 3 minutes PRN For ANY of the following changes in patient status:  A. RR LESS THAN 10 breaths per minute, B. Oxygen saturation LESS THAN 90%, C. Sedation score of 6  ondansetron Injectable 4 milliGRAM(s) IV Push every 6 hours PRN Nausea      Diet, NPO:   With Hard Candy  With Ice Chips/Sips of Water (18 @ 19:03)      PHYSICAL EXAM:  General Appearance: Appears well, NAD  Neck: Supple  Chest: Equal expansion bilaterally, equal breath sounds  CV: S1, S2, RRR  Abdomen: Soft, NT, ND  Extremities: DP pulses b/l, PT dopplerable  Neuro: A&Ox3

## 2018-03-08 LAB
ANION GAP SERPL CALC-SCNC: 6 MMOL/L — LOW (ref 7–14)
APTT BLD: 25.3 SEC — LOW (ref 27–39.2)
BUN SERPL-MCNC: 15 MG/DL — SIGNIFICANT CHANGE UP (ref 10–20)
CALCIUM SERPL-MCNC: 8.8 MG/DL — SIGNIFICANT CHANGE UP (ref 8.5–10.1)
CHLORIDE SERPL-SCNC: 108 MMOL/L — SIGNIFICANT CHANGE UP (ref 98–110)
CO2 SERPL-SCNC: 22 MMOL/L — SIGNIFICANT CHANGE UP (ref 17–32)
CREAT SERPL-MCNC: 1.3 MG/DL — SIGNIFICANT CHANGE UP (ref 0.7–1.5)
GLUCOSE SERPL-MCNC: 108 MG/DL — SIGNIFICANT CHANGE UP (ref 70–110)
HCT VFR BLD CALC: 30.4 % — LOW (ref 37–47)
HGB BLD-MCNC: 9.9 G/DL — LOW (ref 12–16)
INR BLD: 1.07 RATIO — SIGNIFICANT CHANGE UP (ref 0.65–1.3)
MAGNESIUM SERPL-MCNC: 2 MG/DL — SIGNIFICANT CHANGE UP (ref 1.8–2.4)
MCHC RBC-ENTMCNC: 30.9 PG — SIGNIFICANT CHANGE UP (ref 27–31)
MCHC RBC-ENTMCNC: 32.6 G/DL — SIGNIFICANT CHANGE UP (ref 32–37)
MCV RBC AUTO: 95 FL — SIGNIFICANT CHANGE UP (ref 81–99)
NRBC # BLD: 0 /100 WBCS — SIGNIFICANT CHANGE UP (ref 0–0)
PHOSPHATE SERPL-MCNC: 3.3 MG/DL — SIGNIFICANT CHANGE UP (ref 2.1–4.9)
PLATELET # BLD AUTO: 187 K/UL — SIGNIFICANT CHANGE UP (ref 130–400)
POTASSIUM SERPL-MCNC: 4 MMOL/L — SIGNIFICANT CHANGE UP (ref 3.5–5)
POTASSIUM SERPL-SCNC: 4 MMOL/L — SIGNIFICANT CHANGE UP (ref 3.5–5)
PROTHROM AB SERPL-ACNC: 11.6 SEC — SIGNIFICANT CHANGE UP (ref 9.95–12.87)
RBC # BLD: 3.2 M/UL — LOW (ref 4.2–5.4)
RBC # FLD: 13.2 % — SIGNIFICANT CHANGE UP (ref 11.5–14.5)
SODIUM SERPL-SCNC: 136 MMOL/L — SIGNIFICANT CHANGE UP (ref 135–146)
WBC # BLD: 14.71 K/UL — HIGH (ref 4.8–10.8)
WBC # FLD AUTO: 14.71 K/UL — HIGH (ref 4.8–10.8)

## 2018-03-08 RX ORDER — OXYCODONE HYDROCHLORIDE 5 MG/1
5 TABLET ORAL EVERY 4 HOURS
Qty: 0 | Refills: 0 | Status: DISCONTINUED | OUTPATIENT
Start: 2018-03-08 | End: 2018-03-08

## 2018-03-08 RX ORDER — OXYCODONE AND ACETAMINOPHEN 5; 325 MG/1; MG/1
1 TABLET ORAL EVERY 4 HOURS
Qty: 0 | Refills: 0 | Status: DISCONTINUED | OUTPATIENT
Start: 2018-03-08 | End: 2018-03-12

## 2018-03-08 RX ORDER — CEVIMELINE 30 MG/1
30 CAPSULE ORAL
Qty: 0 | Refills: 0 | Status: DISCONTINUED | OUTPATIENT
Start: 2018-03-08 | End: 2018-03-12

## 2018-03-08 RX ORDER — METOPROLOL TARTRATE 50 MG
25 TABLET ORAL
Qty: 0 | Refills: 0 | Status: DISCONTINUED | OUTPATIENT
Start: 2018-03-08 | End: 2018-03-12

## 2018-03-08 RX ADMIN — LOSARTAN POTASSIUM 25 MILLIGRAM(S): 100 TABLET, FILM COATED ORAL at 06:45

## 2018-03-08 RX ADMIN — CEVIMELINE 30 MILLIGRAM(S): 30 CAPSULE ORAL at 14:47

## 2018-03-08 RX ADMIN — HYDROMORPHONE HYDROCHLORIDE 30 MILLILITER(S): 2 INJECTION INTRAMUSCULAR; INTRAVENOUS; SUBCUTANEOUS at 15:59

## 2018-03-08 RX ADMIN — Medication 25 MILLIGRAM(S): at 17:20

## 2018-03-08 RX ADMIN — SODIUM CHLORIDE 75 MILLILITER(S): 9 INJECTION, SOLUTION INTRAVENOUS at 12:01

## 2018-03-08 RX ADMIN — PANTOPRAZOLE SODIUM 40 MILLIGRAM(S): 20 TABLET, DELAYED RELEASE ORAL at 14:46

## 2018-03-08 RX ADMIN — Medication 5 MILLIGRAM(S): at 00:20

## 2018-03-08 NOTE — PROGRESS NOTE ADULT - SUBJECTIVE AND OBJECTIVE BOX
S/P AORTOBIFEM BYPASS 3/5    PAST MEDICAL & SURGICAL HISTORY:  Myocardial infarction:   Chronic kidney disease: stage III  H/O heart artery stent: lad  CAD (coronary artery disease)  Renal tubular acidosis  Lupus  Sjogren's disease  H/O  section  H/O cystoscopy: last being 3-18-16  H/O lithotripsy: eswl x5 last being 17    Vital Signs Last 24 Hrs  T(C): 37.1 (08 Mar 2018 04:47), Max: 37.6 (07 Mar 2018 07:00)  T(F): 98.7 (08 Mar 2018 04:47), Max: 99.6 (07 Mar 2018 07:00)  HR: 100 (08 Mar 2018 04:47) (86 - 117)  BP: 120/71 (08 Mar 2018 04:47) (95/52 - 123/65)  BP(mean): --  RR: 18 (08 Mar 2018 04:47) (12 - 20)  SpO2: 98% (07 Mar 2018 09:00) (98% - 99%)    I&O's Detail    06 Mar 2018 07:01  -  07 Mar 2018 07:00  --------------------------------------------------------  IN:    dextrose 5% + sodium chloride 0.45% with potassium chloride 20 mEq/L: 1425 mL    Enteral Tube Flush: 60 mL    IV PiggyBack: 300 mL  Total IN: 1785 mL    OUT:    Indwelling Catheter - Urethral: 2470 mL    Nasoenteral Tube: 350 mL  Total OUT: 2820 mL    Total NET: -1035 mL      07 Mar 2018 07:01  -  08 Mar 2018 06:43  --------------------------------------------------------  IN:  Total IN: 0 mL    OUT:    Indwelling Catheter - Urethral: 900 mL    Nasoenteral Tube: 685 mL  Total OUT: 1585 mL    Total NET: -1585 mL               9.9    14.71 )-----------( 187      (  @ 01:20 )             30.4                10.4   15.02 )-----------( 208      (  @ 00:06 )             31.6                10.8   15.14 )-----------( 223      (  @ 16:23 )             31.9                    136   |  108   |  15                 Ca: 8.8    BMP:   ----------------------------< 108    M.0   (18 @ 01:20)             4.0    |  22    | 1.3                Ph: 3.3        PT/INR - ( 08 Mar 2018 01:20 )   PT: 11.60 sec;   INR: 1.07 ratio         PTT - ( 08 Mar 2018 01:20 )  PTT:25.3 sec    CARDIAC MARKERS ( 06 Mar 2018 17:00 )  <0.02 ng/mL / x     / 249 U/L / x     / 1.2 ng/mL    ABG - ( 06 Mar 2018 15:50 )  pH: 7.37  /  pCO2: 36    /  pO2: 58    / HCO3: 21    / Base Excess: -3.8  /  SaO2: 90        MEDICATIONS  (STANDING):  dextrose 5% + sodium chloride 0.45%. 1000 milliLiter(s) (75 mL/Hr) IV Continuous <Continuous>  HYDROmorphone PCA (1 mG/mL) 30 milliLiter(s) PCA Continuous PCA Continuous  losartan 25 milliGRAM(s) Enteral Tube daily  metoprolol    tartrate Injectable 5 milliGRAM(s) IV Push every 6 hours  pantoprazole  Injectable 40 milliGRAM(s) IV Push daily    MEDICATIONS  (PRN):  naloxone Injectable 0.1 milliGRAM(s) IV Push every 3 minutes PRN For ANY of the following changes in patient status:  A. RR LESS THAN 10 breaths per minute, B. Oxygen saturation LESS THAN 90%, C. Sedation score of 6  ondansetron Injectable 4 milliGRAM(s) IV Push every 6 hours PRN Nausea    Diet, NPO:   With Hard Candy  With Ice Chips/Sips of Water (18 @ 19:03)    PHYSICAL EXAM:  General Appearance: Appears well, NAD, Ng tube in place  Neck: Supple  Chest: Equal expansion bilaterally, equal breath sounds  CV: S1, S2, RRR  Abdomen: Soft, NT, ND  Extremities: Grossly symmetric, warm feet, pulses present  Neuro: A&Ox3

## 2018-03-09 LAB
ANION GAP SERPL CALC-SCNC: 9 MMOL/L — SIGNIFICANT CHANGE UP (ref 7–14)
BUN SERPL-MCNC: 15 MG/DL — SIGNIFICANT CHANGE UP (ref 10–20)
CALCIUM SERPL-MCNC: 9.1 MG/DL — SIGNIFICANT CHANGE UP (ref 8.5–10.1)
CHLORIDE SERPL-SCNC: 108 MMOL/L — SIGNIFICANT CHANGE UP (ref 98–110)
CO2 SERPL-SCNC: 21 MMOL/L — SIGNIFICANT CHANGE UP (ref 17–32)
CREAT SERPL-MCNC: 1.2 MG/DL — SIGNIFICANT CHANGE UP (ref 0.7–1.5)
GLUCOSE SERPL-MCNC: 92 MG/DL — SIGNIFICANT CHANGE UP (ref 70–110)
HCT VFR BLD CALC: 30.3 % — LOW (ref 37–47)
HGB BLD-MCNC: 9.9 G/DL — LOW (ref 12–16)
MAGNESIUM SERPL-MCNC: 2.2 MG/DL — SIGNIFICANT CHANGE UP (ref 1.8–2.4)
MCHC RBC-ENTMCNC: 30.7 PG — SIGNIFICANT CHANGE UP (ref 27–31)
MCHC RBC-ENTMCNC: 32.7 G/DL — SIGNIFICANT CHANGE UP (ref 32–37)
MCV RBC AUTO: 94.1 FL — SIGNIFICANT CHANGE UP (ref 81–99)
NRBC # BLD: 0 /100 WBCS — SIGNIFICANT CHANGE UP (ref 0–0)
PHOSPHATE SERPL-MCNC: 3.6 MG/DL — SIGNIFICANT CHANGE UP (ref 2.1–4.9)
PLATELET # BLD AUTO: 215 K/UL — SIGNIFICANT CHANGE UP (ref 130–400)
POTASSIUM SERPL-MCNC: 3.5 MMOL/L — SIGNIFICANT CHANGE UP (ref 3.5–5)
POTASSIUM SERPL-SCNC: 3.5 MMOL/L — SIGNIFICANT CHANGE UP (ref 3.5–5)
RBC # BLD: 3.22 M/UL — LOW (ref 4.2–5.4)
RBC # FLD: 13 % — SIGNIFICANT CHANGE UP (ref 11.5–14.5)
SODIUM SERPL-SCNC: 138 MMOL/L — SIGNIFICANT CHANGE UP (ref 135–146)
SURGICAL PATHOLOGY STUDY: SIGNIFICANT CHANGE UP
WBC # BLD: 13.17 K/UL — HIGH (ref 4.8–10.8)
WBC # FLD AUTO: 13.17 K/UL — HIGH (ref 4.8–10.8)

## 2018-03-09 RX ORDER — POTASSIUM CHLORIDE 20 MEQ
20 PACKET (EA) ORAL ONCE
Qty: 0 | Refills: 0 | Status: COMPLETED | OUTPATIENT
Start: 2018-03-09 | End: 2018-03-09

## 2018-03-09 RX ORDER — OXYCODONE AND ACETAMINOPHEN 5; 325 MG/1; MG/1
2 TABLET ORAL EVERY 4 HOURS
Qty: 0 | Refills: 0 | Status: CANCELLED | OUTPATIENT
Start: 2018-03-16 | End: 2018-03-12

## 2018-03-09 RX ORDER — ACETAMINOPHEN 500 MG
325 TABLET ORAL EVERY 4 HOURS
Qty: 0 | Refills: 0 | Status: DISCONTINUED | OUTPATIENT
Start: 2018-03-09 | End: 2018-03-12

## 2018-03-09 RX ORDER — PANTOPRAZOLE SODIUM 20 MG/1
40 TABLET, DELAYED RELEASE ORAL EVERY 12 HOURS
Qty: 0 | Refills: 0 | Status: DISCONTINUED | OUTPATIENT
Start: 2018-03-09 | End: 2018-03-12

## 2018-03-09 RX ADMIN — OXYCODONE AND ACETAMINOPHEN 1 TABLET(S): 5; 325 TABLET ORAL at 22:23

## 2018-03-09 RX ADMIN — OXYCODONE AND ACETAMINOPHEN 1 TABLET(S): 5; 325 TABLET ORAL at 13:15

## 2018-03-09 RX ADMIN — SODIUM CHLORIDE 75 MILLILITER(S): 9 INJECTION, SOLUTION INTRAVENOUS at 00:29

## 2018-03-09 RX ADMIN — CEVIMELINE 30 MILLIGRAM(S): 30 CAPSULE ORAL at 00:35

## 2018-03-09 RX ADMIN — LOSARTAN POTASSIUM 25 MILLIGRAM(S): 100 TABLET, FILM COATED ORAL at 06:00

## 2018-03-09 RX ADMIN — CEVIMELINE 30 MILLIGRAM(S): 30 CAPSULE ORAL at 11:38

## 2018-03-09 RX ADMIN — OXYCODONE AND ACETAMINOPHEN 1 TABLET(S): 5; 325 TABLET ORAL at 18:16

## 2018-03-09 RX ADMIN — CEVIMELINE 30 MILLIGRAM(S): 30 CAPSULE ORAL at 06:01

## 2018-03-09 RX ADMIN — Medication 25 MILLIGRAM(S): at 06:00

## 2018-03-09 RX ADMIN — PANTOPRAZOLE SODIUM 40 MILLIGRAM(S): 20 TABLET, DELAYED RELEASE ORAL at 18:19

## 2018-03-09 RX ADMIN — OXYCODONE AND ACETAMINOPHEN 1 TABLET(S): 5; 325 TABLET ORAL at 13:45

## 2018-03-09 RX ADMIN — Medication 5 MILLIGRAM(S): at 22:19

## 2018-03-09 RX ADMIN — CEVIMELINE 30 MILLIGRAM(S): 30 CAPSULE ORAL at 18:16

## 2018-03-09 RX ADMIN — Medication 50 MILLIEQUIVALENT(S): at 09:49

## 2018-03-09 RX ADMIN — Medication 25 MILLIGRAM(S): at 18:16

## 2018-03-09 NOTE — PROGRESS NOTE ADULT - SUBJECTIVE AND OBJECTIVE BOX
DAPHNE THURSTON  47y Female   [id number]    S/P AORTOBIFEM BYPASS 3/5    Patient is a 47y old  Female who presents with a chief complaint of   PAST MEDICAL & SURGICAL HISTORY:  Myocardial infarction:   Chronic kidney disease: stage III  H/O heart artery stent: lad  CAD (coronary artery disease)  Renal tubular acidosis  Lupus  Sjogren's disease  H/O  section  H/O cystoscopy: last being 3-18-16  H/O lithotripsy: eswl x5 last being 6-27-17      Events of the Last 24h: ng put out 50cc of brown fluid since 11pn yesterday  Vital Signs Last 24 Hrs  T(C): 37.1 (09 Mar 2018 00:00), Max: 37.1 (09 Mar 2018 00:00)  T(F): 98.8 (09 Mar 2018 00:00), Max: 98.8 (09 Mar 2018 00:00)  HR: 102 (09 Mar 2018 00:00) (101 - 126)  BP: 112/69 (09 Mar 2018 00:00) (105/66 - 121/71)  BP(mean): --  RR: 18 (09 Mar 2018 00:00) (16 - 18)  SpO2: --        Diet, NPO:   With Hard Candy  With Ice Chips/Sips of Water (18 @ 19:03)      I&O's Summary    07 Mar 2018 07:  -  08 Mar 2018 07:00  --------------------------------------------------------  IN: 0 mL / OUT: 1585 mL / NET: -1585 mL    08 Mar 2018 07:  -  09 Mar 2018 05:34  --------------------------------------------------------  IN: 0 mL / OUT: 1300 mL / NET: -1300 mL     I&O's Detail    07 Mar 2018 07:  -  08 Mar 2018 07:00  --------------------------------------------------------  IN:  Total IN: 0 mL    OUT:    Indwelling Catheter - Urethral: 900 mL    Nasoenteral Tube: 685 mL  Total OUT: 1585 mL    Total NET: -1585 mL      08 Mar 2018 07:01  -  09 Mar 2018 05:34  --------------------------------------------------------  IN:  Total IN: 0 mL    OUT:    Indwelling Catheter - Urethral: 1200 mL    Nasoenteral Tube: 100 mL  Total OUT: 1300 mL    Total NET: -1300 mL          MEDICATIONS  (STANDING):  cevimeline 30 milliGRAM(s) Oral four times a day  dextrose 5% + sodium chloride 0.45%. 1000 milliLiter(s) (75 mL/Hr) IV Continuous <Continuous>  HYDROmorphone PCA (1 mG/mL) 30 milliLiter(s) PCA Continuous PCA Continuous  losartan 25 milliGRAM(s) Enteral Tube daily  metoprolol     tartrate 25 milliGRAM(s) Oral two times a day  pantoprazole  Injectable 40 milliGRAM(s) IV Push daily    MEDICATIONS  (PRN):  naloxone Injectable 0.1 milliGRAM(s) IV Push every 3 minutes PRN For ANY of the following changes in patient status:  A. RR LESS THAN 10 breaths per minute, B. Oxygen saturation LESS THAN 90%, C. Sedation score of 6  ondansetron Injectable 4 milliGRAM(s) IV Push every 6 hours PRN Nausea  oxyCODONE    5 mG/acetaminophen 325 mG 1 Tablet(s) Oral every 4 hours PRN Severe Pain (7 - 10)      PHYSICAL EXAM:  General Appearance: Appears in mild distress due to pain, NAD, Ng tube in place  Chest: Equal expansion bilaterally, equal breath sounds  CV: S1, S2, RRR  Abdomen: Soft, mild distension, midline incision and bilat groin incisions c/d/i w/o bruising. tender to palp, +bs  Extremities: Grossly symmetric, warm feet, pulses present  Neuro: A&Ox3                          9.9    13.17 )-----------( 215      ( 09 Mar 2018 00:54 )             30.3        CBC Full  -  ( 09 Mar 2018 00:54 )  WBC Count : 13.17 K/uL  Hemoglobin : 9.9 g/dL  Hematocrit : 30.3 %  Platelet Count - Automated : 215 K/uL  Mean Cell Volume : 94.1 fL  Mean Cell Hemoglobin : 30.7 pg  Mean Cell Hemoglobin Concentration : 32.7 g/dL                 138   |  108   |  15                 Ca: 9.1    BMP:   ----------------------------< 92     M.2   (18 @ 00:54)             3.5    |  21    | 1.2                Ph: 3.6          PT/INR - ( 08 Mar 2018 01:20 )   PT: 11.60 sec;   INR: 1.07 ratio    PTT - ( 08 Mar 2018 01:20 )  PTT:25.3 sec

## 2018-03-09 NOTE — DIETITIAN INITIAL EVALUATION ADULT. - ENERGY NEEDS
1464-1586kcal (MSJ x 1.2-1.3 AF)  protein 63-75g (1-1.2g/kg ABW)  fluid 1464-1586ml (1ml/kcal) 1464-1586kcal (MSJ x 1.2-1.3 AF)  protein 50-63g (0.8-1g/kg ABW) for CKD stage III   fluid 1464-1586ml (1ml/kcal)

## 2018-03-09 NOTE — PROGRESS NOTE ADULT - ASSESSMENT
Pt continues to have brown output from NG and pain despite PCA. Pt has yet to have BM but has BS. Will continue NG suction and encourage ambulation. consider wean off of PCA today? will discuss with attending.

## 2018-03-09 NOTE — DIETITIAN INITIAL EVALUATION ADULT. - OTHER INFO
s/p PCI, occluded distal aorta with claudication, s/p aortobifemoral bypass, hx of MI, NGT to suction, hx of CKD stage III noted, pt. reports no BM since surgery, c/o distended stomach

## 2018-03-10 LAB
ANION GAP SERPL CALC-SCNC: 7 MMOL/L — SIGNIFICANT CHANGE UP (ref 7–14)
ANION GAP SERPL CALC-SCNC: 8 MMOL/L — SIGNIFICANT CHANGE UP (ref 7–14)
BLD GP AB SCN SERPL QL: SIGNIFICANT CHANGE UP
BUN SERPL-MCNC: 15 MG/DL — SIGNIFICANT CHANGE UP (ref 10–20)
BUN SERPL-MCNC: 17 MG/DL — SIGNIFICANT CHANGE UP (ref 10–20)
CALCIUM SERPL-MCNC: 9.1 MG/DL — SIGNIFICANT CHANGE UP (ref 8.5–10.1)
CALCIUM SERPL-MCNC: 9.1 MG/DL — SIGNIFICANT CHANGE UP (ref 8.5–10.1)
CHLORIDE SERPL-SCNC: 110 MMOL/L — SIGNIFICANT CHANGE UP (ref 98–110)
CHLORIDE SERPL-SCNC: 113 MMOL/L — HIGH (ref 98–110)
CO2 SERPL-SCNC: 19 MMOL/L — SIGNIFICANT CHANGE UP (ref 17–32)
CO2 SERPL-SCNC: 22 MMOL/L — SIGNIFICANT CHANGE UP (ref 17–32)
CREAT SERPL-MCNC: 1.4 MG/DL — SIGNIFICANT CHANGE UP (ref 0.7–1.5)
CREAT SERPL-MCNC: 1.5 MG/DL — SIGNIFICANT CHANGE UP (ref 0.7–1.5)
GLUCOSE SERPL-MCNC: 85 MG/DL — SIGNIFICANT CHANGE UP (ref 70–110)
GLUCOSE SERPL-MCNC: 98 MG/DL — SIGNIFICANT CHANGE UP (ref 70–110)
HCT VFR BLD CALC: 25.3 % — LOW (ref 37–47)
HCT VFR BLD CALC: 27.4 % — LOW (ref 37–47)
HGB BLD-MCNC: 8.3 G/DL — LOW (ref 12–16)
HGB BLD-MCNC: 9.2 G/DL — LOW (ref 12–16)
MAGNESIUM SERPL-MCNC: 2 MG/DL — SIGNIFICANT CHANGE UP (ref 1.8–2.4)
MCHC RBC-ENTMCNC: 30.5 PG — SIGNIFICANT CHANGE UP (ref 27–31)
MCHC RBC-ENTMCNC: 31 PG — SIGNIFICANT CHANGE UP (ref 27–31)
MCHC RBC-ENTMCNC: 32.8 G/DL — SIGNIFICANT CHANGE UP (ref 32–37)
MCHC RBC-ENTMCNC: 33.6 G/DL — SIGNIFICANT CHANGE UP (ref 32–37)
MCV RBC AUTO: 92.3 FL — SIGNIFICANT CHANGE UP (ref 81–99)
MCV RBC AUTO: 93 FL — SIGNIFICANT CHANGE UP (ref 81–99)
NRBC # BLD: 0 /100 WBCS — SIGNIFICANT CHANGE UP (ref 0–0)
NRBC # BLD: 0 /100 WBCS — SIGNIFICANT CHANGE UP (ref 0–0)
PHOSPHATE SERPL-MCNC: 4.3 MG/DL — SIGNIFICANT CHANGE UP (ref 2.1–4.9)
PLATELET # BLD AUTO: 180 K/UL — SIGNIFICANT CHANGE UP (ref 130–400)
PLATELET # BLD AUTO: 239 K/UL — SIGNIFICANT CHANGE UP (ref 130–400)
POTASSIUM SERPL-MCNC: 3.1 MMOL/L — LOW (ref 3.5–5)
POTASSIUM SERPL-MCNC: 3.2 MMOL/L — LOW (ref 3.5–5)
POTASSIUM SERPL-SCNC: 3.1 MMOL/L — LOW (ref 3.5–5)
POTASSIUM SERPL-SCNC: 3.2 MMOL/L — LOW (ref 3.5–5)
RBC # BLD: 2.72 M/UL — LOW (ref 4.2–5.4)
RBC # BLD: 2.97 M/UL — LOW (ref 4.2–5.4)
RBC # FLD: 12.5 % — SIGNIFICANT CHANGE UP (ref 11.5–14.5)
RBC # FLD: 12.6 % — SIGNIFICANT CHANGE UP (ref 11.5–14.5)
SODIUM SERPL-SCNC: 139 MMOL/L — SIGNIFICANT CHANGE UP (ref 135–146)
SODIUM SERPL-SCNC: 140 MMOL/L — SIGNIFICANT CHANGE UP (ref 135–146)
TYPE + AB SCN PNL BLD: SIGNIFICANT CHANGE UP
WBC # BLD: 10.08 K/UL — SIGNIFICANT CHANGE UP (ref 4.8–10.8)
WBC # BLD: 8.98 K/UL — SIGNIFICANT CHANGE UP (ref 4.8–10.8)
WBC # FLD AUTO: 10.08 K/UL — SIGNIFICANT CHANGE UP (ref 4.8–10.8)
WBC # FLD AUTO: 8.98 K/UL — SIGNIFICANT CHANGE UP (ref 4.8–10.8)

## 2018-03-10 RX ORDER — POTASSIUM CHLORIDE 20 MEQ
20 PACKET (EA) ORAL
Qty: 0 | Refills: 0 | Status: COMPLETED | OUTPATIENT
Start: 2018-03-10 | End: 2018-03-10

## 2018-03-10 RX ORDER — POTASSIUM CHLORIDE 20 MEQ
40 PACKET (EA) ORAL ONCE
Qty: 0 | Refills: 0 | Status: COMPLETED | OUTPATIENT
Start: 2018-03-10 | End: 2018-03-10

## 2018-03-10 RX ORDER — POTASSIUM CHLORIDE 20 MEQ
20 PACKET (EA) ORAL ONCE
Qty: 0 | Refills: 0 | Status: COMPLETED | OUTPATIENT
Start: 2018-03-10 | End: 2018-03-10

## 2018-03-10 RX ORDER — POTASSIUM BICARBONATE 978 MG/1
15 TABLET, EFFERVESCENT ORAL DAILY
Qty: 0 | Refills: 0 | Status: DISCONTINUED | OUTPATIENT
Start: 2018-03-10 | End: 2018-03-12

## 2018-03-10 RX ADMIN — Medication 25 MILLIGRAM(S): at 05:43

## 2018-03-10 RX ADMIN — OXYCODONE AND ACETAMINOPHEN 1 TABLET(S): 5; 325 TABLET ORAL at 12:54

## 2018-03-10 RX ADMIN — OXYCODONE AND ACETAMINOPHEN 1 TABLET(S): 5; 325 TABLET ORAL at 15:32

## 2018-03-10 RX ADMIN — Medication 25 MILLIGRAM(S): at 17:25

## 2018-03-10 RX ADMIN — OXYCODONE AND ACETAMINOPHEN 1 TABLET(S): 5; 325 TABLET ORAL at 22:31

## 2018-03-10 RX ADMIN — OXYCODONE AND ACETAMINOPHEN 1 TABLET(S): 5; 325 TABLET ORAL at 21:49

## 2018-03-10 RX ADMIN — Medication 20 MILLIEQUIVALENT(S): at 21:49

## 2018-03-10 RX ADMIN — OXYCODONE AND ACETAMINOPHEN 1 TABLET(S): 5; 325 TABLET ORAL at 17:40

## 2018-03-10 RX ADMIN — CEVIMELINE 30 MILLIGRAM(S): 30 CAPSULE ORAL at 23:07

## 2018-03-10 RX ADMIN — OXYCODONE AND ACETAMINOPHEN 1 TABLET(S): 5; 325 TABLET ORAL at 05:46

## 2018-03-10 RX ADMIN — Medication 50 MILLIEQUIVALENT(S): at 15:29

## 2018-03-10 RX ADMIN — PANTOPRAZOLE SODIUM 40 MILLIGRAM(S): 20 TABLET, DELAYED RELEASE ORAL at 17:25

## 2018-03-10 RX ADMIN — PANTOPRAZOLE SODIUM 40 MILLIGRAM(S): 20 TABLET, DELAYED RELEASE ORAL at 05:44

## 2018-03-10 RX ADMIN — OXYCODONE AND ACETAMINOPHEN 1 TABLET(S): 5; 325 TABLET ORAL at 06:15

## 2018-03-10 RX ADMIN — SODIUM CHLORIDE 75 MILLILITER(S): 9 INJECTION, SOLUTION INTRAVENOUS at 21:48

## 2018-03-10 RX ADMIN — CEVIMELINE 30 MILLIGRAM(S): 30 CAPSULE ORAL at 17:26

## 2018-03-10 RX ADMIN — CEVIMELINE 30 MILLIGRAM(S): 30 CAPSULE ORAL at 05:44

## 2018-03-10 RX ADMIN — Medication 5 MILLIGRAM(S): at 21:49

## 2018-03-10 RX ADMIN — Medication 20 MILLIEQUIVALENT(S): at 21:48

## 2018-03-10 RX ADMIN — Medication 40 MILLIEQUIVALENT(S): at 15:28

## 2018-03-10 RX ADMIN — LOSARTAN POTASSIUM 25 MILLIGRAM(S): 100 TABLET, FILM COATED ORAL at 05:43

## 2018-03-10 RX ADMIN — OXYCODONE AND ACETAMINOPHEN 1 TABLET(S): 5; 325 TABLET ORAL at 11:17

## 2018-03-10 RX ADMIN — CEVIMELINE 30 MILLIGRAM(S): 30 CAPSULE ORAL at 11:15

## 2018-03-10 NOTE — PROGRESS NOTE ADULT - ASSESSMENT
Doing well. Ambulating. Tolerating clears. NG tube taken out 3/9. F/u bowel function and advance diet. Continue ambulation.

## 2018-03-10 NOTE — PROGRESS NOTE ADULT - SUBJECTIVE AND OBJECTIVE BOX
S/P AORTOBIFEM 3/    PAST MEDICAL & SURGICAL HISTORY:  Myocardial infarction: -  Chronic kidney disease: stage III  H/O heart artery stent: lad  CAD (coronary artery disease)  Renal tubular acidosis  Lupus  Sjogren's disease  H/O  section  H/O cystoscopy: last being 3-18-16  H/O lithotripsy: eswl x5 last being 17    Vital Signs Last 24 Hrs  T(C): 37.5 (09 Mar 2018 23:57), Max: 37.8 (09 Mar 2018 16:08)  T(F): 99.5 (09 Mar 2018 23:57), Max: 100 (09 Mar 2018 16:08)  HR: 88 (10 Mar 2018 04:51) (88 - 104)  BP: 145/74 (10 Mar 2018 04:51) (106/64 - 145/74)  BP(mean): --  RR: 18 (09 Mar 2018 23:57) (18 - 18)  SpO2: 95% (09 Mar 2018 07:30) (95% - 95%)    I&O's Detail    08 Mar 2018 07:01  -  09 Mar 2018 07:00  --------------------------------------------------------  IN:    dextrose 5% + sodium chloride 0.45%.: 800 mL    Enteral Tube Flush: 40 mL  Total IN: 840 mL    OUT:    Indwelling Catheter - Urethral: 1750 mL    Nasoenteral Tube: 350 mL  Total OUT: 2100 mL    Total NET: -1260 mL      09 Mar 2018 07:01  -  10 Mar 2018 05:08  --------------------------------------------------------  IN:  Total IN: 0 mL    OUT:    Indwelling Catheter - Urethral: 1000 mL    Nasoenteral Tube: 250 mL  Total OUT: 1250 mL    Total NET: -1250 mL               8.3    10.08 )-----------( 180      ( 03-10 @ 01:26 )             25.3                9.9    13.17 )-----------( 215      (  @ 00:54 )             30.3                    140   |  113   |  17                 Ca: 9.1    BMP:   ----------------------------< 85     M.0   (03-10-18 @ 01:26)             3.1    |  19    | 1.4                Ph: 4.3        MEDICATIONS  (STANDING):  bisacodyl 5 milliGRAM(s) Oral at bedtime  cevimeline 30 milliGRAM(s) Oral four times a day  dextrose 5% + sodium chloride 0.45%. 1000 milliLiter(s) (75 mL/Hr) IV Continuous <Continuous>  losartan 25 milliGRAM(s) Enteral Tube daily  metoprolol     tartrate 25 milliGRAM(s) Oral two times a day  pantoprazole  Injectable 40 milliGRAM(s) IV Push every 12 hours    MEDICATIONS  (PRN):  acetaminophen   Tablet 325 milliGRAM(s) Oral every 4 hours PRN For temp greater than 100.1, if needed  ondansetron Injectable 4 milliGRAM(s) IV Push every 6 hours PRN Nausea  oxyCODONE    5 mG/acetaminophen 325 mG 1 Tablet(s) Oral every 4 hours PRN Severe Pain (7 - 10)      Diet, Clear Liquid (18 @ 14:13)      PHYSICAL EXAM:  General Appearance: Appears well, NAD  Neck: Supple  Chest: Equal expansion bilaterally, equal breath sounds  CV: S1, S2, RRR  Abdomen: Soft, ND, slight tenderness  Extremities: Grossly symmetric, WNL  Neuro: A&Ox3

## 2018-03-11 LAB
ANION GAP SERPL CALC-SCNC: 10 MMOL/L — SIGNIFICANT CHANGE UP (ref 7–14)
BLD GP AB SCN SERPL QL: SIGNIFICANT CHANGE UP
BUN SERPL-MCNC: 13 MG/DL — SIGNIFICANT CHANGE UP (ref 10–20)
CALCIUM SERPL-MCNC: 8.8 MG/DL — SIGNIFICANT CHANGE UP (ref 8.5–10.1)
CHLORIDE SERPL-SCNC: 110 MMOL/L — SIGNIFICANT CHANGE UP (ref 98–110)
CO2 SERPL-SCNC: 18 MMOL/L — SIGNIFICANT CHANGE UP (ref 17–32)
CREAT SERPL-MCNC: 1.5 MG/DL — SIGNIFICANT CHANGE UP (ref 0.7–1.5)
GLUCOSE SERPL-MCNC: 91 MG/DL — SIGNIFICANT CHANGE UP (ref 70–110)
HCT VFR BLD CALC: 28.1 % — LOW (ref 37–47)
HGB BLD-MCNC: 9.4 G/DL — LOW (ref 12–16)
MAGNESIUM SERPL-MCNC: 1.9 MG/DL — SIGNIFICANT CHANGE UP (ref 1.8–2.4)
MCHC RBC-ENTMCNC: 30.7 PG — SIGNIFICANT CHANGE UP (ref 27–31)
MCHC RBC-ENTMCNC: 33.5 G/DL — SIGNIFICANT CHANGE UP (ref 32–37)
MCV RBC AUTO: 91.8 FL — SIGNIFICANT CHANGE UP (ref 81–99)
NRBC # BLD: 0 /100 WBCS — SIGNIFICANT CHANGE UP (ref 0–0)
PLATELET # BLD AUTO: 252 K/UL — SIGNIFICANT CHANGE UP (ref 130–400)
POTASSIUM SERPL-MCNC: 3.7 MMOL/L — SIGNIFICANT CHANGE UP (ref 3.5–5)
POTASSIUM SERPL-SCNC: 3.7 MMOL/L — SIGNIFICANT CHANGE UP (ref 3.5–5)
RBC # BLD: 3.06 M/UL — LOW (ref 4.2–5.4)
RBC # FLD: 12.5 % — SIGNIFICANT CHANGE UP (ref 11.5–14.5)
SODIUM SERPL-SCNC: 138 MMOL/L — SIGNIFICANT CHANGE UP (ref 135–146)
TYPE + AB SCN PNL BLD: SIGNIFICANT CHANGE UP
WBC # BLD: 9.67 K/UL — SIGNIFICANT CHANGE UP (ref 4.8–10.8)
WBC # FLD AUTO: 9.67 K/UL — SIGNIFICANT CHANGE UP (ref 4.8–10.8)

## 2018-03-11 RX ORDER — ASPIRIN/CALCIUM CARB/MAGNESIUM 324 MG
81 TABLET ORAL DAILY
Qty: 0 | Refills: 0 | Status: DISCONTINUED | OUTPATIENT
Start: 2018-03-11 | End: 2018-03-12

## 2018-03-11 RX ADMIN — Medication 325 MILLIGRAM(S): at 18:03

## 2018-03-11 RX ADMIN — Medication 25 MILLIGRAM(S): at 18:01

## 2018-03-11 RX ADMIN — LOSARTAN POTASSIUM 25 MILLIGRAM(S): 100 TABLET, FILM COATED ORAL at 06:37

## 2018-03-11 RX ADMIN — Medication 81 MILLIGRAM(S): at 14:28

## 2018-03-11 RX ADMIN — Medication 5 MILLIGRAM(S): at 21:16

## 2018-03-11 RX ADMIN — OXYCODONE AND ACETAMINOPHEN 1 TABLET(S): 5; 325 TABLET ORAL at 21:16

## 2018-03-11 RX ADMIN — CEVIMELINE 30 MILLIGRAM(S): 30 CAPSULE ORAL at 23:35

## 2018-03-11 RX ADMIN — PANTOPRAZOLE SODIUM 40 MILLIGRAM(S): 20 TABLET, DELAYED RELEASE ORAL at 11:02

## 2018-03-11 RX ADMIN — OXYCODONE AND ACETAMINOPHEN 1 TABLET(S): 5; 325 TABLET ORAL at 21:46

## 2018-03-11 RX ADMIN — CEVIMELINE 30 MILLIGRAM(S): 30 CAPSULE ORAL at 11:02

## 2018-03-11 RX ADMIN — Medication 25 MILLIGRAM(S): at 06:37

## 2018-03-11 RX ADMIN — OXYCODONE AND ACETAMINOPHEN 1 TABLET(S): 5; 325 TABLET ORAL at 14:56

## 2018-03-11 RX ADMIN — CEVIMELINE 30 MILLIGRAM(S): 30 CAPSULE ORAL at 18:01

## 2018-03-11 RX ADMIN — OXYCODONE AND ACETAMINOPHEN 1 TABLET(S): 5; 325 TABLET ORAL at 11:08

## 2018-03-11 RX ADMIN — PANTOPRAZOLE SODIUM 40 MILLIGRAM(S): 20 TABLET, DELAYED RELEASE ORAL at 21:15

## 2018-03-11 RX ADMIN — CEVIMELINE 30 MILLIGRAM(S): 30 CAPSULE ORAL at 06:37

## 2018-03-11 NOTE — PROGRESS NOTE ADULT - SUBJECTIVE AND OBJECTIVE BOX
VASCULAR SURGERY PROGRESS NOTE    Hospital Day #6d  Post-Op Day #6    Procedure/Dx: s/o aortobifemoral bypass    Events of past 24 hours:    patient reports no acute events. Jackson was removed, post void 350cc.  Diet advanced to fulls, tolerated well. Denies fever, chills, sob, dizziness, nausea, vomiting    Diet: fulls    I&O's Detail    09 Mar 2018 07:01  -  10 Mar 2018 07:00  --------------------------------------------------------  IN:  Total IN: 0 mL    OUT:    Indwelling Catheter - Urethral: 2100 mL    Nasoenteral Tube: 250 mL  Total OUT: 2350 mL    Total NET: -2350 mL      10 Mar 2018 06:01  -  11 Mar 2018 05:51  --------------------------------------------------------  IN:  Total IN: 0 mL    OUT:    Voided: 350 mL  Total OUT: 350 mL    Total NET: -350 mL          PHYSICAL EXAM    Vital Signs Last 24 Hrs  T(C): 37.1 (10 Mar 2018 15:28), Max: 37.4 (10 Mar 2018 08:00)  T(F): 98.8 (10 Mar 2018 15:28), Max: 99.3 (10 Mar 2018 08:00)  HR: 90 (10 Mar 2018 15:28) (90 - 95)  BP: 108/71 (10 Mar 2018 15:28) (108/71 - 112/70)  BP(mean): --  RR: 18 (10 Mar 2018 15:28) (18 - 18)  SpO2: --    Gen: NAD, AAOx3  CV: S1 S2 RRR  Lungs: CTABL  Abd: +BS SOFT NT ND  Incision:   Ext: NO EDEMA NON TENDER    MEDICATIONS  (STANDING):  bisacodyl 5 milliGRAM(s) Oral at bedtime  cevimeline 30 milliGRAM(s) Oral four times a day  dextrose 5% + sodium chloride 0.45%. 1000 milliLiter(s) (75 mL/Hr) IV Continuous <Continuous>  losartan 25 milliGRAM(s) Enteral Tube daily  metoprolol     tartrate 25 milliGRAM(s) Oral two times a day  pantoprazole  Injectable 40 milliGRAM(s) IV Push every 12 hours  potassium bicarbonate/potassium citrate 15 milliEquivalent(s) Oral daily    MEDICATIONS  (PRN):  acetaminophen   Tablet 325 milliGRAM(s) Oral every 4 hours PRN For temp greater than 100.1, if needed  ondansetron Injectable 4 milliGRAM(s) IV Push every 6 hours PRN Nausea  oxyCODONE    5 mG/acetaminophen 325 mG 1 Tablet(s) Oral every 4 hours PRN Severe Pain (7 - 10)        LAB/STUDIES:                        9.2    8.98  )-----------( 239      ( 10 Mar 2018 18:32 )             27.4     03-10    139  |  110  |  15  ----------------------------<  98  3.2<L>   |  22  |  1.5    Ca    9.1      10 Mar 2018 18:32  Phos  4.3     03-10  Mg     2.0     03-10

## 2018-03-11 NOTE — PROGRESS NOTE ADULT - ASSESSMENT
- advance diet as tolerated  - oob, ambulate, IS 10x/1hr  - monitor VS, CBC (low Hb) and BMP (low K+, replete q daily)

## 2018-03-12 ENCOUNTER — TRANSCRIPTION ENCOUNTER (OUTPATIENT)
Age: 48
End: 2018-03-12

## 2018-03-12 VITALS
HEART RATE: 85 BPM | RESPIRATION RATE: 18 BRPM | TEMPERATURE: 99 F | DIASTOLIC BLOOD PRESSURE: 63 MMHG | SYSTOLIC BLOOD PRESSURE: 110 MMHG

## 2018-03-12 RX ORDER — ACETAMINOPHEN 500 MG
1 TABLET ORAL
Qty: 0 | Refills: 0 | COMMUNITY
Start: 2018-03-12

## 2018-03-12 RX ADMIN — Medication 25 MILLIGRAM(S): at 05:53

## 2018-03-12 RX ADMIN — OXYCODONE AND ACETAMINOPHEN 1 TABLET(S): 5; 325 TABLET ORAL at 09:00

## 2018-03-12 RX ADMIN — LOSARTAN POTASSIUM 25 MILLIGRAM(S): 100 TABLET, FILM COATED ORAL at 05:53

## 2018-03-12 RX ADMIN — OXYCODONE AND ACETAMINOPHEN 1 TABLET(S): 5; 325 TABLET ORAL at 04:33

## 2018-03-12 RX ADMIN — OXYCODONE AND ACETAMINOPHEN 1 TABLET(S): 5; 325 TABLET ORAL at 05:00

## 2018-03-12 RX ADMIN — CEVIMELINE 30 MILLIGRAM(S): 30 CAPSULE ORAL at 05:51

## 2018-03-12 RX ADMIN — PANTOPRAZOLE SODIUM 40 MILLIGRAM(S): 20 TABLET, DELAYED RELEASE ORAL at 08:14

## 2018-03-12 RX ADMIN — OXYCODONE AND ACETAMINOPHEN 1 TABLET(S): 5; 325 TABLET ORAL at 08:22

## 2018-03-12 NOTE — PROGRESS NOTE ADULT - SUBJECTIVE AND OBJECTIVE BOX
VASCULAR SURGERY PROGRESS NOTE    Hospital Day #7d  Post-Op Day #7    Procedure/Dx: S/P AORTOBIFEM 3/5    Events of past 24 hours: Given Aspiring 81mg, advanced diet to regular diet, tolerating for now, dressings taken off, mobilize the patient      PAST MEDICAL & SURGICAL HISTORY:  Myocardial infarction:   Chronic kidney disease: stage III  H/O heart artery stent: lad  CAD (coronary artery disease)  Renal tubular acidosis  Lupus  Sjogren's disease  H/O  section  H/O cystoscopy: last being 3-18-16  H/O lithotripsy: eswl x5 last being 17      Vital Signs Last 24 Hrs  T(C): 37 (12 Mar 2018 00:10), Max: 37.9 (11 Mar 2018 18:04)  T(F): 98.6 (12 Mar 2018 00:10), Max: 100.3 (11 Mar 2018 18:04)  HR: 91 (12 Mar 2018 00:10) (88 - 91)  BP: 117/68 (12 Mar 2018 00:10) (117/68 - 129/76)  BP(mean): --  RR: 18 (12 Mar 2018 00:10) (18 - 18)  SpO2: --    Pain (0-10):            Pain Control Adequate: [] YES [] N    Diet:  Diet, Regular      I&O's Detail    10 Mar 2018 06:01  -  11 Mar 2018 07:00  --------------------------------------------------------  IN:    dextrose 5% + sodium chloride 0.45%.: 825 mL  Total IN: 825 mL    OUT:    Voided: 350 mL  Total OUT: 350 mL    Total NET: 475 mL          Bowel Movement:[] YES [x] NO  Flatus: [x] YES [] NO  Activity: out of bed  PHYSICAL EXAM  Gen: no pallor  CV: s1/s2 normal  Lungs: b/l clear   Abd: soft, non tender, dressings removed, b/l groin dressings redressed  Inc: clean  Ext: b/l palpable pulses    MEDICATIONS  (STANDING):  aspirin  chewable 81 milliGRAM(s) Oral daily  bisacodyl 5 milliGRAM(s) Oral at bedtime  cevimeline 30 milliGRAM(s) Oral four times a day  losartan 25 milliGRAM(s) Enteral Tube daily  metoprolol     tartrate 25 milliGRAM(s) Oral two times a day  pantoprazole  Injectable 40 milliGRAM(s) IV Push every 12 hours  potassium bicarbonate/potassium citrate 15 milliEquivalent(s) Oral daily    MEDICATIONS  (PRN):  acetaminophen   Tablet 325 milliGRAM(s) Oral every 4 hours PRN For temp greater than 100.1, if needed  ondansetron Injectable 4 milliGRAM(s) IV Push every 6 hours PRN Nausea  oxyCODONE    5 mG/acetaminophen 325 mG 1 Tablet(s) Oral every 4 hours PRN Severe Pain (7 - 10)      DVT PROPHYLAXIS: [] YES [x] NO   GI PROPHYLAXIS: [x] YES [] NO pantoprazole  Injectable 40 milliGRAM(s)    ANTIPLATELETS: [x] YES [] NO   ANTICOAGULATION: [] YES [x] NO   ANTIBIOTICS: [] YES [x] NO     LAB/STUDIES:              9.4                 9.67   >---------<  252                             28.1                               138    | 110    | 13     ---------------------------<91    18 @ 05:47  3.7    | 18     | 1.5      Anion Gap:10     eGFR:42               8.8              v           18 @ 05:47  x       |  1.9              x      |x      |x                          -------------------------[x    (18 @ 05:47)           x       |x       |x                                                                         x      ----------<  x     18 @ 05:47  x        Lipase: x    18 @ 05:47  Amylase: x    18 @ 05:47  Lactate: x    18 @ 05:47        CAPILLARY BLOOD GLUCOSE                IMAGING:    ASSESSMENT/PLAN:      SPECTRA: [id number]

## 2018-03-12 NOTE — DISCHARGE NOTE ADULT - MEDICATION SUMMARY - MEDICATIONS TO TAKE
I will START or STAY ON the medications listed below when I get home from the hospital:    acetaminophen 325 mg oral tablet  -- 1 tab(s) by mouth every 4 hours, As needed, For temp greater than 100.1, if needed  -- Indication: For pain    Aspir 81  -- orally once a day  -- Indication: For peripheral disease and heart disease    traMADol  -- 75 milligram(s) by mouth once a day  -- Indication: For pain    losartan 25 mg oral tablet  -- 1 tab(s) by mouth once a day (in the evening)  -- Indication: For blood pressure    simvastatin 20 mg oral tablet  -- 1 tab(s) by mouth once a day (at bedtime)  -- Indication: For cholesterol    Brilinta (ticagrelor) 90 mg oral tablet  -- 1 tab(s) by mouth 2 times a day  -- Indication: For heart stents    metoprolol succinate 25 mg oral tablet, extended release  -- 1 tab(s) by mouth once (at bedtime)  -- Indication: For blood pressure    Evoxac 30 mg oral capsule  -- orally 4 times a day  -- Indication: For Sjogrenes    Urocit-K 10 mEq oral tablet, extended release  -- 1 tab(s) by mouth 3 times a day  -- Indication: For Sjogren

## 2018-03-12 NOTE — PROGRESS NOTE ADULT - ASSESSMENT
Plan to get PT and walk the patient around tomorrow, follow up labs, work on discharge planning tomorrow, tolerating regular diet for now

## 2018-03-12 NOTE — DISCHARGE NOTE ADULT - CARE PROVIDER_API CALL
Michel Singletary), Vascular Surgery  [address] [place] [address]  Phone: [phone]  Fax: [phone]

## 2018-03-12 NOTE — DISCHARGE NOTE ADULT - INSTRUCTIONS
regular diet  Activities: may shower, keep incision sites clean and intact, wear loose fitting underwear

## 2018-03-12 NOTE — DISCHARGE NOTE ADULT - HOSPITAL COURSE
48yo female with history of recent mi s/p pci, significant PMHx including CKD, Lupus, Sjogren's disease, found to have occluded  distal aorta with claudication taken today for aortobifemoral bypass, NGT placed intraop. No intraoperative complications. Post operatively she was observed in ICU. Pt was downgraded post op day 2. Barroso removed but she failed to void and it was re-inserted. Pt started to have flatus on 3/10/18, NGT removed. Pt started ambulating, passed trial of void when her barroso was removed for the second time. She finally moved her bowels 3/12/18. She was deemed ready for discharge on 3/12/18.

## 2018-03-12 NOTE — DISCHARGE NOTE ADULT - PATIENT PORTAL LINK FT
You can access the [facility] Portal, offered by [facility], by registering with the following website: [email or web address]

## 2018-03-12 NOTE — DISCHARGE NOTE ADULT - CONDITIONS AT DISCHARGE
Patient can take a shower but must not wash abdomen, must pat dry and not rub. Steri strips to midline will fall off and must not be pulled.

## 2018-03-12 NOTE — DISCHARGE NOTE ADULT - PLAN OF CARE
s/p aortibifemoral bypass Follow Up with Dr. Singletary in 2 weeks. Call [phone] with any issues or concerns

## 2018-03-12 NOTE — DISCHARGE NOTE ADULT - CARE PLAN
Principal Discharge DX:	Aorto-iliac disease  Goal:	s/p aortibifemoral bypass  Assessment and plan of treatment:	Follow Up with Dr. Singletary in 2 weeks. Call [phone] with any issues or concerns

## 2018-03-13 DIAGNOSIS — M32.9 SYSTEMIC LUPUS ERYTHEMATOSUS, UNSPECIFIED: ICD-10-CM

## 2018-03-13 DIAGNOSIS — N25.89 OTHER DISORDERS RESULTING FROM IMPAIRED RENAL TUBULAR FUNCTION: ICD-10-CM

## 2018-03-13 DIAGNOSIS — I12.9 HYPERTENSIVE CHRONIC KIDNEY DISEASE WITH STAGE 1 THROUGH STAGE 4 CHRONIC KIDNEY DISEASE, OR UNSPECIFIED CHRONIC KIDNEY DISEASE: ICD-10-CM

## 2018-03-13 DIAGNOSIS — I70.0 ATHEROSCLEROSIS OF AORTA: ICD-10-CM

## 2018-03-13 DIAGNOSIS — N18.9 CHRONIC KIDNEY DISEASE, UNSPECIFIED: ICD-10-CM

## 2018-03-13 DIAGNOSIS — Z95.5 PRESENCE OF CORONARY ANGIOPLASTY IMPLANT AND GRAFT: ICD-10-CM

## 2018-03-13 DIAGNOSIS — Z87.891 PERSONAL HISTORY OF NICOTINE DEPENDENCE: ICD-10-CM

## 2018-03-13 DIAGNOSIS — I25.2 OLD MYOCARDIAL INFARCTION: ICD-10-CM

## 2018-03-13 DIAGNOSIS — I74.5 EMBOLISM AND THROMBOSIS OF ILIAC ARTERY: ICD-10-CM

## 2018-03-13 DIAGNOSIS — I73.9 PERIPHERAL VASCULAR DISEASE, UNSPECIFIED: ICD-10-CM

## 2018-03-13 DIAGNOSIS — Z87.442 PERSONAL HISTORY OF URINARY CALCULI: ICD-10-CM

## 2018-03-13 DIAGNOSIS — I25.10 ATHEROSCLEROTIC HEART DISEASE OF NATIVE CORONARY ARTERY WITHOUT ANGINA PECTORIS: ICD-10-CM

## 2018-03-13 DIAGNOSIS — Z98.61 CORONARY ANGIOPLASTY STATUS: ICD-10-CM

## 2018-03-13 DIAGNOSIS — N18.3 CHRONIC KIDNEY DISEASE, STAGE 3 (MODERATE): ICD-10-CM

## 2018-03-14 DIAGNOSIS — I73.9 PERIPHERAL VASCULAR DISEASE, UNSPECIFIED: ICD-10-CM

## 2018-03-15 DIAGNOSIS — N18.4 CHRONIC KIDNEY DISEASE, STAGE 4 (SEVERE): ICD-10-CM

## 2018-03-15 DIAGNOSIS — I74.09 OTHER ARTERIAL EMBOLISM AND THROMBOSIS OF ABDOMINAL AORTA: ICD-10-CM

## 2018-03-27 ENCOUNTER — APPOINTMENT (OUTPATIENT)
Dept: VASCULAR SURGERY | Facility: CLINIC | Age: 48
End: 2018-03-27
Payer: COMMERCIAL

## 2018-03-27 VITALS — HEIGHT: 61 IN | BODY MASS INDEX: 24.73 KG/M2 | WEIGHT: 131 LBS

## 2018-03-27 PROCEDURE — 99024 POSTOP FOLLOW-UP VISIT: CPT

## 2018-05-17 ENCOUNTER — INPATIENT (INPATIENT)
Facility: HOSPITAL | Age: 48
LOS: 0 days | Discharge: HOME | End: 2018-05-18
Attending: UROLOGY | Admitting: UROLOGY
Payer: COMMERCIAL

## 2018-05-17 VITALS
DIASTOLIC BLOOD PRESSURE: 78 MMHG | RESPIRATION RATE: 16 BRPM | SYSTOLIC BLOOD PRESSURE: 118 MMHG | TEMPERATURE: 96 F | WEIGHT: 128.09 LBS | OXYGEN SATURATION: 99 % | HEART RATE: 96 BPM

## 2018-05-17 DIAGNOSIS — Z98.891 HISTORY OF UTERINE SCAR FROM PREVIOUS SURGERY: Chronic | ICD-10-CM

## 2018-05-17 DIAGNOSIS — Z98.890 OTHER SPECIFIED POSTPROCEDURAL STATES: Chronic | ICD-10-CM

## 2018-05-17 DIAGNOSIS — N23 UNSPECIFIED RENAL COLIC: ICD-10-CM

## 2018-05-17 DIAGNOSIS — Z95.828 PRESENCE OF OTHER VASCULAR IMPLANTS AND GRAFTS: Chronic | ICD-10-CM

## 2018-05-17 LAB
ALBUMIN SERPL ELPH-MCNC: 4.3 G/DL — SIGNIFICANT CHANGE UP (ref 3.5–5.2)
ALP SERPL-CCNC: 94 U/L — SIGNIFICANT CHANGE UP (ref 30–115)
ALT FLD-CCNC: 11 U/L — SIGNIFICANT CHANGE UP (ref 0–41)
ANION GAP SERPL CALC-SCNC: 12 MMOL/L — SIGNIFICANT CHANGE UP (ref 7–14)
APPEARANCE UR: (no result)
APTT BLD: 27.6 SEC — SIGNIFICANT CHANGE UP (ref 27–39.2)
AST SERPL-CCNC: 11 U/L — SIGNIFICANT CHANGE UP (ref 0–41)
BACTERIA # UR AUTO: (no result)
BASOPHILS # BLD AUTO: 0.05 K/UL — SIGNIFICANT CHANGE UP (ref 0–0.2)
BASOPHILS NFR BLD AUTO: 1 % — SIGNIFICANT CHANGE UP (ref 0–1)
BILIRUB SERPL-MCNC: <0.2 MG/DL — SIGNIFICANT CHANGE UP (ref 0.2–1.2)
BILIRUB UR-MCNC: NEGATIVE — SIGNIFICANT CHANGE UP
BUN SERPL-MCNC: 20 MG/DL — SIGNIFICANT CHANGE UP (ref 10–20)
CALCIUM SERPL-MCNC: 10 MG/DL — SIGNIFICANT CHANGE UP (ref 8.5–10.1)
CHLORIDE SERPL-SCNC: 111 MMOL/L — HIGH (ref 98–110)
CO2 SERPL-SCNC: 19 MMOL/L — SIGNIFICANT CHANGE UP (ref 17–32)
COD CRY URNS QL: NEGATIVE — SIGNIFICANT CHANGE UP
COLOR SPEC: YELLOW — SIGNIFICANT CHANGE UP
CREAT SERPL-MCNC: 1.6 MG/DL — HIGH (ref 0.7–1.5)
DIFF PNL FLD: (no result)
EOSINOPHIL # BLD AUTO: 0.09 K/UL — SIGNIFICANT CHANGE UP (ref 0–0.7)
EOSINOPHIL NFR BLD AUTO: 1.8 % — SIGNIFICANT CHANGE UP (ref 0–8)
EPI CELLS # UR: (no result) /HPF
GLUCOSE SERPL-MCNC: 113 MG/DL — HIGH (ref 70–99)
GLUCOSE UR QL: NEGATIVE MG/DL — SIGNIFICANT CHANGE UP
GRAN CASTS # UR COMP ASSIST: NEGATIVE — SIGNIFICANT CHANGE UP
HCT VFR BLD CALC: 37.5 % — SIGNIFICANT CHANGE UP (ref 37–47)
HGB BLD-MCNC: 12.3 G/DL — SIGNIFICANT CHANGE UP (ref 12–16)
HYALINE CASTS # UR AUTO: NEGATIVE — SIGNIFICANT CHANGE UP
IMM GRANULOCYTES NFR BLD AUTO: 0.4 % — HIGH (ref 0.1–0.3)
INR BLD: 1.01 RATIO — SIGNIFICANT CHANGE UP (ref 0.65–1.3)
KETONES UR-MCNC: NEGATIVE — SIGNIFICANT CHANGE UP
LEUKOCYTE ESTERASE UR-ACNC: NEGATIVE — SIGNIFICANT CHANGE UP
LYMPHOCYTES # BLD AUTO: 1.21 K/UL — SIGNIFICANT CHANGE UP (ref 1.2–3.4)
LYMPHOCYTES # BLD AUTO: 24.5 % — SIGNIFICANT CHANGE UP (ref 20.5–51.1)
MCHC RBC-ENTMCNC: 31.7 PG — HIGH (ref 27–31)
MCHC RBC-ENTMCNC: 32.8 G/DL — SIGNIFICANT CHANGE UP (ref 32–37)
MCV RBC AUTO: 96.6 FL — SIGNIFICANT CHANGE UP (ref 81–99)
MONOCYTES # BLD AUTO: 0.49 K/UL — SIGNIFICANT CHANGE UP (ref 0.1–0.6)
MONOCYTES NFR BLD AUTO: 9.9 % — HIGH (ref 1.7–9.3)
NEUTROPHILS # BLD AUTO: 3.07 K/UL — SIGNIFICANT CHANGE UP (ref 1.4–6.5)
NEUTROPHILS NFR BLD AUTO: 62.4 % — SIGNIFICANT CHANGE UP (ref 42.2–75.2)
NITRITE UR-MCNC: NEGATIVE — SIGNIFICANT CHANGE UP
NRBC # BLD: 0 /100 WBCS — SIGNIFICANT CHANGE UP (ref 0–0)
PH UR: 7 — SIGNIFICANT CHANGE UP (ref 5–8)
PLATELET # BLD AUTO: 406 K/UL — HIGH (ref 130–400)
POTASSIUM SERPL-MCNC: 4.5 MMOL/L — SIGNIFICANT CHANGE UP (ref 3.5–5)
POTASSIUM SERPL-SCNC: 4.5 MMOL/L — SIGNIFICANT CHANGE UP (ref 3.5–5)
PROT SERPL-MCNC: 8 G/DL — SIGNIFICANT CHANGE UP (ref 6–8)
PROT UR-MCNC: 30 MG/DL
PROTHROM AB SERPL-ACNC: 10.9 SEC — SIGNIFICANT CHANGE UP (ref 9.95–12.87)
RBC # BLD: 3.88 M/UL — LOW (ref 4.2–5.4)
RBC # FLD: 13.6 % — SIGNIFICANT CHANGE UP (ref 11.5–14.5)
RBC CASTS # UR COMP ASSIST: >50 /HPF
SODIUM SERPL-SCNC: 142 MMOL/L — SIGNIFICANT CHANGE UP (ref 135–146)
SP GR SPEC: 1.01 — SIGNIFICANT CHANGE UP (ref 1.01–1.03)
TRI-PHOS CRY UR QL COMP ASSIST: NEGATIVE — SIGNIFICANT CHANGE UP
URATE CRY FLD QL MICRO: NEGATIVE — SIGNIFICANT CHANGE UP
UROBILINOGEN FLD QL: 0.2 MG/DL — SIGNIFICANT CHANGE UP (ref 0.2–0.2)
WBC # BLD: 4.93 K/UL — SIGNIFICANT CHANGE UP (ref 4.8–10.8)
WBC # FLD AUTO: 4.93 K/UL — SIGNIFICANT CHANGE UP (ref 4.8–10.8)
WBC UR QL: NEGATIVE — SIGNIFICANT CHANGE UP

## 2018-05-17 PROCEDURE — 99223 1ST HOSP IP/OBS HIGH 75: CPT

## 2018-05-17 RX ORDER — MORPHINE SULFATE 50 MG/1
4 CAPSULE, EXTENDED RELEASE ORAL EVERY 4 HOURS
Qty: 0 | Refills: 0 | Status: DISCONTINUED | OUTPATIENT
Start: 2018-05-17 | End: 2018-05-17

## 2018-05-17 RX ORDER — TICAGRELOR 90 MG/1
1 TABLET ORAL
Qty: 0 | Refills: 0 | COMMUNITY

## 2018-05-17 RX ORDER — SIMVASTATIN 20 MG/1
20 TABLET, FILM COATED ORAL AT BEDTIME
Qty: 0 | Refills: 0 | Status: DISCONTINUED | OUTPATIENT
Start: 2018-05-17 | End: 2018-05-18

## 2018-05-17 RX ORDER — ACETAMINOPHEN 500 MG
650 TABLET ORAL EVERY 6 HOURS
Qty: 0 | Refills: 0 | Status: DISCONTINUED | OUTPATIENT
Start: 2018-05-17 | End: 2018-05-18

## 2018-05-17 RX ORDER — ASPIRIN/CALCIUM CARB/MAGNESIUM 324 MG
81 TABLET ORAL DAILY
Qty: 0 | Refills: 0 | Status: DISCONTINUED | OUTPATIENT
Start: 2018-05-17 | End: 2018-05-18

## 2018-05-17 RX ORDER — TAMSULOSIN HYDROCHLORIDE 0.4 MG/1
0.4 CAPSULE ORAL ONCE
Qty: 0 | Refills: 0 | Status: COMPLETED | OUTPATIENT
Start: 2018-05-17 | End: 2018-05-17

## 2018-05-17 RX ORDER — SODIUM CHLORIDE 9 MG/ML
1000 INJECTION INTRAMUSCULAR; INTRAVENOUS; SUBCUTANEOUS
Qty: 0 | Refills: 0 | Status: DISCONTINUED | OUTPATIENT
Start: 2018-05-17 | End: 2018-05-18

## 2018-05-17 RX ORDER — MORPHINE SULFATE 50 MG/1
4 CAPSULE, EXTENDED RELEASE ORAL ONCE
Qty: 0 | Refills: 0 | Status: DISCONTINUED | OUTPATIENT
Start: 2018-05-17 | End: 2018-05-18

## 2018-05-17 RX ORDER — LOSARTAN POTASSIUM 100 MG/1
25 TABLET, FILM COATED ORAL DAILY
Qty: 0 | Refills: 0 | Status: DISCONTINUED | OUTPATIENT
Start: 2018-05-17 | End: 2018-05-18

## 2018-05-17 RX ORDER — SODIUM CHLORIDE 9 MG/ML
1000 INJECTION INTRAMUSCULAR; INTRAVENOUS; SUBCUTANEOUS ONCE
Qty: 0 | Refills: 0 | Status: COMPLETED | OUTPATIENT
Start: 2018-05-17 | End: 2018-05-17

## 2018-05-17 RX ORDER — HYDROMORPHONE HYDROCHLORIDE 2 MG/ML
1 INJECTION INTRAMUSCULAR; INTRAVENOUS; SUBCUTANEOUS EVERY 4 HOURS
Qty: 0 | Refills: 0 | Status: DISCONTINUED | OUTPATIENT
Start: 2018-05-17 | End: 2018-05-18

## 2018-05-17 RX ORDER — MORPHINE SULFATE 50 MG/1
4 CAPSULE, EXTENDED RELEASE ORAL ONCE
Qty: 0 | Refills: 0 | Status: DISCONTINUED | OUTPATIENT
Start: 2018-05-17 | End: 2018-05-17

## 2018-05-17 RX ORDER — METOPROLOL TARTRATE 50 MG
25 TABLET ORAL DAILY
Qty: 0 | Refills: 0 | Status: DISCONTINUED | OUTPATIENT
Start: 2018-05-17 | End: 2018-05-18

## 2018-05-17 RX ORDER — PANTOPRAZOLE SODIUM 20 MG/1
40 TABLET, DELAYED RELEASE ORAL
Qty: 0 | Refills: 0 | Status: DISCONTINUED | OUTPATIENT
Start: 2018-05-17 | End: 2018-05-18

## 2018-05-17 RX ORDER — TRAMADOL HYDROCHLORIDE 50 MG/1
75 TABLET ORAL
Qty: 0 | Refills: 0 | COMMUNITY

## 2018-05-17 RX ADMIN — SODIUM CHLORIDE 1000 MILLILITER(S): 9 INJECTION INTRAMUSCULAR; INTRAVENOUS; SUBCUTANEOUS at 16:07

## 2018-05-17 RX ADMIN — TAMSULOSIN HYDROCHLORIDE 0.4 MILLIGRAM(S): 0.4 CAPSULE ORAL at 20:28

## 2018-05-17 RX ADMIN — MORPHINE SULFATE 4 MILLIGRAM(S): 50 CAPSULE, EXTENDED RELEASE ORAL at 16:07

## 2018-05-17 RX ADMIN — MORPHINE SULFATE 4 MILLIGRAM(S): 50 CAPSULE, EXTENDED RELEASE ORAL at 20:13

## 2018-05-17 NOTE — H&P ADULT - NSHPLABSRESULTS_GEN_ALL_CORE
12.3   4.93  )-----------( 406      ( 17 May 2018 15:55 )             37.5         142  |  111<H>  |  20  ----------------------------<  113<H>  4.5   |  19  |  1.6<H>    Ca    10.0      17 May 2018 15:55    TPro  8.0  /  Alb  4.3  /  TBili  <0.2  /  DBili  x   /  AST  11  /  ALT  11  /  AlkPhos  94            Urinalysis Basic - ( 17 May 2018 17:28 )    Color: Yellow / Appearance: Slightly Cloudy / S.015 / pH: x  Gluc: x / Ketone: Negative  / Bili: Negative / Urobili: 0.2 mg/dL   Blood: x / Protein: 30 mg/dL / Nitrite: Negative   Leuk Esterase: Negative / RBC: >50 /HPF / WBC Negative   Sq Epi: x / Non Sq Epi: Few /HPF / Bacteria: Few      PT/INR - ( 17 May 2018 15:55 )   PT: 10.90 sec;   INR: 1.01 ratio         PTT - ( 17 May 2018 15:55 )  PTT:27.6 sec      < from: CT Abdomen and Pelvis No Cont (18 @ 18:16) >    IMPRESSION:        1.  Mild right hydroureteronephrosis extending to two obstructing calculi   in the proximal ureter measuring 6 x 6 x 7 mm (HU 1435), and in the     < end of copied text >

## 2018-05-17 NOTE — H&P ADULT - HISTORY OF PRESENT ILLNESS
48yF presents to ED for right flank pain x3 days, waxes and wanes.  Pt has hx of kidney stones, follows with Dr. Fink, states this feels very similar to her previous stones.  Pt has hx of CKD, RA, lupus, MI, and had aortic-bifemoral bypass 2 months ago.  Pt dneies any focal weakness, numbness, tingling, back pain, abd pain, nausea, vomiting, CP, 48 y.o female presents to ED for right flank pain x3 days, waxes and wanes.  Pt has hx of kidney stones, follows with Dr. Fink, states this feels very similar to her previous stones.  Pt has hx of CKD, RA, lupus, MI, and had aortic-bifemoral bypass 2 months ago.  Pt dneies any focal weakness, numbness, tingling, back pain, abd pain, nausea, vomiting, CP, and SOB.

## 2018-05-17 NOTE — H&P ADULT - ATTENDING COMMENTS
Pt interviewed and examined.  Multiple right ureteral stones 7 mm with intermittant colic.  Currently pain is well controlled.  No signs of sepsis.  Size of stones make them unlikely to pass, but not impossible considering pt has passed fairly large stones in the past.  She has multiple comorbidities including:     CAD s/p MI approximately 10 months ago  CKD  PAD s/p aortobifem surgery  lupu  sjogrens  HTN    Discussed fully with patient attempt at passage vs. stenting with ureteroscopy and laser litho now, possible future ESWL.  Will require cardiology and medical evaluation prior to general anesthesia and operative procedure.  Pt considering options.  Keep npo.

## 2018-05-17 NOTE — ED PROVIDER NOTE - ATTENDING CONTRIBUTION TO CARE
I personally evaluated the patient. I reviewed the Resident’s or Physician Assistant’s note (as assigned above), and agree with the findings and plan except as documented in my note.  48y female above PMH with right flank pain typical of prior renal colic, no f/c, +hematuria, on exam vital signs appreciated, well appearing, abd +bs, snt/nd, no cvat, will check labs, urine, imaging

## 2018-05-17 NOTE — ED PROVIDER NOTE - NS ED ROS FT
Review of Systems    Constitutional: (-) fever/ chills (-) weight loss  Eyes/ENT: (-) blurry vision, (-) epistaxis (-) sore throat (-) ear pain  Cardiovascular: (-) chest pain, (-) syncope (-) palpitations  Respiratory: (-) cough, (-) shortness of breath  Gastrointestinal: (-) vomiting, (-) diarrhea (-) abdominal pain  Musculoskeletal: (-) neck pain, (-) back pain, (+)flank pain, (-) joint pain (-) pedal edema   Integumentary: (-) rash, (-) swelling  Neurological: (-) headache, (-) altered mental status  Psychiatric: (-) hallucinations or depression   Allergic/Immunologic: (-) pruritus

## 2018-05-17 NOTE — H&P ADULT - NSHPPHYSICALEXAM_GEN_ALL_CORE
Vital Signs Last 24 Hrs  T(C): 36.2 (17 May 2018 22:24), Max: 36.2 (17 May 2018 22:24)  T(F): 97.2 (17 May 2018 22:24), Max: 97.2 (17 May 2018 22:24)  HR: 76 (17 May 2018 22:24) (76 - 96)  BP: 115/83 (17 May 2018 22:24) (115/83 - 118/78)  BP(mean): --  RR: 18 (17 May 2018 22:24) (16 - 18)  SpO2: 98% (17 May 2018 22:24) (98% - 99%) Vital Signs Last 24 Hrs  T(C): 36.2 (17 May 2018 22:24), Max: 36.2 (17 May 2018 22:24)  T(F): 97.2 (17 May 2018 22:24), Max: 97.2 (17 May 2018 22:24)  HR: 76 (17 May 2018 22:24) (76 - 96)  BP: 115/83 (17 May 2018 22:24) (115/83 - 118/78)  BP(mean): --  RR: 18 (17 May 2018 22:24) (16 - 18)  SpO2: 98% (17 May 2018 22:24) (98% - 99%)    Constitutional: well-nourished, NAD  	Eyes: PERRLA, clear conjunctiva  	ENT: MMM,   	Cardiovascular: regular rate, regular rhythm, no murmur appreciated  	Respiratory: unlabored respiratory effort, clear to auscultation bilaterally  	Gastrointestinal: soft, (+) CVT tenderness right, (+) BS, ND, well healed midline scar  	Musculoskeletal: supple neck, no lower extremity edema, no bony tenderness  	Integumentary: warm, dry, no rash, Multiple surgical scars notes to abdominal wall, well healed, no signs of infection.    	Neurologic: AAOx3  Psychiatric: appropriate mood, appropriate affect

## 2018-05-17 NOTE — ED PROVIDER NOTE - OBJECTIVE STATEMENT
48yF presents to ED for right flank pain x3 days, comes and goes.  Pt has hx of kidney stones, follows with Dr. Fink, states this feels very similar to her previous stones.  Pt has hx of CKD, RA, lupus, MI, and had aortic-bifemoral bypass 2 months ago.  Pt dneies any focal weakness, numbness, tingling, back pain, abd pain, nausea, vomiting, CP, SOB. 48yF presents to ED for right flank pain x3 days, waxes and wanes.  Pt has hx of kidney stones, follows with Dr. Fink, states this feels very similar to her previous stones.  Pt has hx of CKD, RA, lupus, MI, and had aortic-bifemoral bypass 2 months ago.  Pt dneies any focal weakness, numbness, tingling, back pain, abd pain, nausea, vomiting, CP, SOB.

## 2018-05-17 NOTE — ED ADULT NURSE NOTE - PSH
H/O  section    H/O cystoscopy  last being 3-18-16  H/O lithotripsy  eswl x5 last being 6-  S/P aorto-bifemoral bypass surgery

## 2018-05-17 NOTE — H&P ADULT - ASSESSMENT
48 y.o female with  hx of CKD, RA, lupus, MI, and aortic-bifemoral bypass 2 months ago is admitted for kidney stone.

## 2018-05-17 NOTE — ED PROVIDER NOTE - PHYSICAL EXAMINATION
Vital Signs: I have reviewed the initial vital signs.  Constitutional: well-nourished, no acute distress, normocephalic  Eyes: PERRLA, EOMI, no nystagmus, clear conjunctiva  ENT: MMM, TM b/l clear , no nasal congestion  Cardiovascular: regular rate, regular rhythm, no murmur appreciated  Respiratory: unlabored respiratory effort, clear to auscultation bilaterally  Gastrointestinal: soft, non-tender abdomen, non-distended.  (+)Right sided CVA tenderness  Musculoskeletal: supple neck, no lower extremity edema, no bony tenderness  Integumentary: warm, dry, no rash, Multiple surgical scars notes to abdominal wall, well healed, no signs of infection.    Neurologic: awake, alert, cranial nerves II-XII grossly intact, extremities’ motor and sensory functions grossly intact, no focal deficits, GCS 15  Psychiatric: appropriate mood, appropriate affect Vital Signs: I have reviewed the initial vital signs.  Constitutional: well-nourished, no acute distress, normocephalic  Eyes: PERRLA, clear conjunctiva  ENT: MMM,   Cardiovascular: regular rate, regular rhythm, no murmur appreciated  Respiratory: unlabored respiratory effort, clear to auscultation bilaterally  Gastrointestinal: soft, non-tender abdomen, non-distended.  (+)Right sided CVA tenderness  Musculoskeletal: supple neck, no lower extremity edema, no bony tenderness  Integumentary: warm, dry, no rash, Multiple surgical scars notes to abdominal wall, well healed, no signs of infection.    Neurologic: awake, alert, no focal deficits, GCS 15  Psychiatric: appropriate mood, appropriate affect

## 2018-05-18 ENCOUNTER — TRANSCRIPTION ENCOUNTER (OUTPATIENT)
Age: 48
End: 2018-05-18

## 2018-05-18 VITALS
OXYGEN SATURATION: 98 % | SYSTOLIC BLOOD PRESSURE: 96 MMHG | HEART RATE: 81 BPM | DIASTOLIC BLOOD PRESSURE: 53 MMHG | RESPIRATION RATE: 12 BRPM

## 2018-05-18 PROBLEM — Z95.5 PRESENCE OF CORONARY ANGIOPLASTY IMPLANT AND GRAFT: Chronic | Status: ACTIVE | Noted: 2018-02-23

## 2018-05-18 PROBLEM — N18.9 CHRONIC KIDNEY DISEASE, UNSPECIFIED: Chronic | Status: ACTIVE | Noted: 2018-02-23

## 2018-05-18 PROBLEM — N25.89 OTHER DISORDERS RESULTING FROM IMPAIRED RENAL TUBULAR FUNCTION: Chronic | Status: ACTIVE | Noted: 2018-02-23

## 2018-05-18 PROBLEM — I21.9 ACUTE MYOCARDIAL INFARCTION, UNSPECIFIED: Chronic | Status: ACTIVE | Noted: 2018-02-23

## 2018-05-18 PROBLEM — M35.00 SJOGREN SYNDROME, UNSPECIFIED: Chronic | Status: ACTIVE | Noted: 2018-02-23

## 2018-05-18 PROBLEM — L93.0 DISCOID LUPUS ERYTHEMATOSUS: Chronic | Status: ACTIVE | Noted: 2018-02-23

## 2018-05-18 PROBLEM — I25.10 ATHEROSCLEROTIC HEART DISEASE OF NATIVE CORONARY ARTERY WITHOUT ANGINA PECTORIS: Chronic | Status: ACTIVE | Noted: 2018-02-23

## 2018-05-18 LAB
ALBUMIN SERPL ELPH-MCNC: 3.9 G/DL — SIGNIFICANT CHANGE UP (ref 3.5–5.2)
ALP SERPL-CCNC: 81 U/L — SIGNIFICANT CHANGE UP (ref 30–115)
ALT FLD-CCNC: 9 U/L — SIGNIFICANT CHANGE UP (ref 0–41)
ANION GAP SERPL CALC-SCNC: 10 MMOL/L — SIGNIFICANT CHANGE UP (ref 7–14)
ANION GAP SERPL CALC-SCNC: 13 MMOL/L — SIGNIFICANT CHANGE UP (ref 7–14)
AST SERPL-CCNC: 9 U/L — SIGNIFICANT CHANGE UP (ref 0–41)
BASE EXCESS BLDA CALC-SCNC: -11.1 MMOL/L — LOW (ref -2–2)
BILIRUB SERPL-MCNC: <0.2 MG/DL — SIGNIFICANT CHANGE UP (ref 0.2–1.2)
BUN SERPL-MCNC: 17 MG/DL — SIGNIFICANT CHANGE UP (ref 10–20)
BUN SERPL-MCNC: 17 MG/DL — SIGNIFICANT CHANGE UP (ref 10–20)
CALCIUM SERPL-MCNC: 9 MG/DL — SIGNIFICANT CHANGE UP (ref 8.5–10.1)
CALCIUM SERPL-MCNC: 9.2 MG/DL — SIGNIFICANT CHANGE UP (ref 8.5–10.1)
CHLORIDE SERPL-SCNC: 112 MMOL/L — HIGH (ref 98–110)
CHLORIDE SERPL-SCNC: 113 MMOL/L — HIGH (ref 98–110)
CO2 SERPL-SCNC: 17 MMOL/L — SIGNIFICANT CHANGE UP (ref 17–32)
CO2 SERPL-SCNC: 18 MMOL/L — SIGNIFICANT CHANGE UP (ref 17–32)
CREAT SERPL-MCNC: 1.4 MG/DL — SIGNIFICANT CHANGE UP (ref 0.7–1.5)
CREAT SERPL-MCNC: 1.5 MG/DL — SIGNIFICANT CHANGE UP (ref 0.7–1.5)
GAS PNL BLDA: SIGNIFICANT CHANGE UP
GLUCOSE SERPL-MCNC: 76 MG/DL — SIGNIFICANT CHANGE UP (ref 70–99)
GLUCOSE SERPL-MCNC: 86 MG/DL — SIGNIFICANT CHANGE UP (ref 70–99)
HCG UR QL: NEGATIVE — SIGNIFICANT CHANGE UP
HCO3 BLDA-SCNC: 14 MMOL/L — LOW (ref 23–27)
HCT VFR BLD CALC: 34.9 % — LOW (ref 37–47)
HGB BLD-MCNC: 10.9 G/DL — LOW (ref 12–16)
HOROWITZ INDEX BLDA+IHG-RTO: 21 — SIGNIFICANT CHANGE UP
MCHC RBC-ENTMCNC: 31.1 PG — HIGH (ref 27–31)
MCHC RBC-ENTMCNC: 31.2 G/DL — LOW (ref 32–37)
MCV RBC AUTO: 99.7 FL — HIGH (ref 81–99)
NRBC # BLD: 0 /100 WBCS — SIGNIFICANT CHANGE UP (ref 0–0)
PCO2 BLDA: 30 MMHG — LOW (ref 38–42)
PH BLDA: 7.29 — LOW (ref 7.38–7.42)
PLATELET # BLD AUTO: 352 K/UL — SIGNIFICANT CHANGE UP (ref 130–400)
PO2 BLDA: 104 MMHG — HIGH (ref 78–95)
POTASSIUM SERPL-MCNC: 5.6 MMOL/L — HIGH (ref 3.5–5)
POTASSIUM SERPL-MCNC: 5.7 MMOL/L — HIGH (ref 3.5–5)
POTASSIUM SERPL-SCNC: 5.6 MMOL/L — HIGH (ref 3.5–5)
POTASSIUM SERPL-SCNC: 5.7 MMOL/L — HIGH (ref 3.5–5)
PROT SERPL-MCNC: 6.9 G/DL — SIGNIFICANT CHANGE UP (ref 6–8)
RBC # BLD: 3.5 M/UL — LOW (ref 4.2–5.4)
RBC # FLD: 14 % — SIGNIFICANT CHANGE UP (ref 11.5–14.5)
SAO2 % BLDA: 98 % — SIGNIFICANT CHANGE UP (ref 94–98)
SODIUM SERPL-SCNC: 141 MMOL/L — SIGNIFICANT CHANGE UP (ref 135–146)
SODIUM SERPL-SCNC: 142 MMOL/L — SIGNIFICANT CHANGE UP (ref 135–146)
WBC # BLD: 5.57 K/UL — SIGNIFICANT CHANGE UP (ref 4.8–10.8)
WBC # FLD AUTO: 5.57 K/UL — SIGNIFICANT CHANGE UP (ref 4.8–10.8)

## 2018-05-18 PROCEDURE — 52332 CYSTOSCOPY AND TREATMENT: CPT | Mod: RT

## 2018-05-18 RX ORDER — INSULIN HUMAN 100 [IU]/ML
10 INJECTION, SOLUTION SUBCUTANEOUS ONCE
Qty: 0 | Refills: 0 | Status: DISCONTINUED | OUTPATIENT
Start: 2018-05-18 | End: 2018-05-18

## 2018-05-18 RX ORDER — SIMVASTATIN 20 MG/1
1 TABLET, FILM COATED ORAL
Qty: 0 | Refills: 0 | COMMUNITY

## 2018-05-18 RX ORDER — POTASSIUM CITRATE MONOHYDRATE 100 %
1 POWDER (GRAM) MISCELLANEOUS
Qty: 0 | Refills: 0 | COMMUNITY

## 2018-05-18 RX ORDER — MORPHINE SULFATE 50 MG/1
4 CAPSULE, EXTENDED RELEASE ORAL EVERY 4 HOURS
Qty: 0 | Refills: 0 | Status: DISCONTINUED | OUTPATIENT
Start: 2018-05-18 | End: 2018-05-18

## 2018-05-18 RX ORDER — SODIUM CHLORIDE 9 MG/ML
1000 INJECTION, SOLUTION INTRAVENOUS
Qty: 0 | Refills: 0 | Status: DISCONTINUED | OUTPATIENT
Start: 2018-05-18 | End: 2018-05-18

## 2018-05-18 RX ORDER — PHENAZOPYRIDINE HCL 100 MG
1 TABLET ORAL
Qty: 21 | Refills: 0 | OUTPATIENT
Start: 2018-05-18 | End: 2018-05-24

## 2018-05-18 RX ORDER — CEVIMELINE 30 MG/1
0 CAPSULE ORAL
Qty: 0 | Refills: 0 | COMMUNITY

## 2018-05-18 RX ORDER — OXYCODONE AND ACETAMINOPHEN 5; 325 MG/1; MG/1
2 TABLET ORAL ONCE
Qty: 0 | Refills: 0 | Status: DISCONTINUED | OUTPATIENT
Start: 2018-05-18 | End: 2018-05-18

## 2018-05-18 RX ORDER — ACETAMINOPHEN 500 MG
650 TABLET ORAL EVERY 6 HOURS
Qty: 0 | Refills: 0 | Status: DISCONTINUED | OUTPATIENT
Start: 2018-05-18 | End: 2018-05-18

## 2018-05-18 RX ORDER — ONDANSETRON 8 MG/1
4 TABLET, FILM COATED ORAL ONCE
Qty: 0 | Refills: 0 | Status: DISCONTINUED | OUTPATIENT
Start: 2018-05-18 | End: 2018-05-18

## 2018-05-18 RX ORDER — CEPHALEXIN 500 MG
1 CAPSULE ORAL
Qty: 12 | Refills: 0 | OUTPATIENT
Start: 2018-05-18 | End: 2018-05-20

## 2018-05-18 RX ORDER — MORPHINE SULFATE 50 MG/1
2 CAPSULE, EXTENDED RELEASE ORAL EVERY 4 HOURS
Qty: 0 | Refills: 0 | Status: DISCONTINUED | OUTPATIENT
Start: 2018-05-18 | End: 2018-05-18

## 2018-05-18 RX ORDER — DEXTROSE 50 % IN WATER 50 %
50 SYRINGE (ML) INTRAVENOUS ONCE
Qty: 0 | Refills: 0 | Status: COMPLETED | OUTPATIENT
Start: 2018-05-18 | End: 2018-05-18

## 2018-05-18 RX ORDER — INSULIN HUMAN 100 [IU]/ML
10 INJECTION, SOLUTION SUBCUTANEOUS ONCE
Qty: 0 | Refills: 0 | Status: COMPLETED | OUTPATIENT
Start: 2018-05-18 | End: 2018-05-18

## 2018-05-18 RX ORDER — ASPIRIN/CALCIUM CARB/MAGNESIUM 324 MG
0 TABLET ORAL
Qty: 0 | Refills: 0 | COMMUNITY

## 2018-05-18 RX ORDER — MORPHINE SULFATE 50 MG/1
2 CAPSULE, EXTENDED RELEASE ORAL
Qty: 0 | Refills: 0 | Status: DISCONTINUED | OUTPATIENT
Start: 2018-05-18 | End: 2018-05-18

## 2018-05-18 RX ADMIN — Medication 650 MILLIGRAM(S): at 06:25

## 2018-05-18 RX ADMIN — SODIUM CHLORIDE 100 MILLILITER(S): 9 INJECTION, SOLUTION INTRAVENOUS at 17:14

## 2018-05-18 RX ADMIN — SODIUM CHLORIDE 100 MILLILITER(S): 9 INJECTION INTRAMUSCULAR; INTRAVENOUS; SUBCUTANEOUS at 00:25

## 2018-05-18 RX ADMIN — PANTOPRAZOLE SODIUM 40 MILLIGRAM(S): 20 TABLET, DELAYED RELEASE ORAL at 06:05

## 2018-05-18 RX ADMIN — Medication 650 MILLIGRAM(S): at 14:38

## 2018-05-18 RX ADMIN — OXYCODONE AND ACETAMINOPHEN 2 TABLET(S): 5; 325 TABLET ORAL at 17:15

## 2018-05-18 RX ADMIN — SODIUM CHLORIDE 100 MILLILITER(S): 9 INJECTION INTRAMUSCULAR; INTRAVENOUS; SUBCUTANEOUS at 11:09

## 2018-05-18 RX ADMIN — OXYCODONE AND ACETAMINOPHEN 2 TABLET(S): 5; 325 TABLET ORAL at 17:04

## 2018-05-18 RX ADMIN — INSULIN HUMAN 10 UNIT(S): 100 INJECTION, SOLUTION SUBCUTANEOUS at 14:35

## 2018-05-18 RX ADMIN — Medication 50 MILLILITER(S): at 14:28

## 2018-05-18 RX ADMIN — MORPHINE SULFATE 2 MILLIGRAM(S): 50 CAPSULE, EXTENDED RELEASE ORAL at 10:18

## 2018-05-18 RX ADMIN — MORPHINE SULFATE 2 MILLIGRAM(S): 50 CAPSULE, EXTENDED RELEASE ORAL at 10:03

## 2018-05-18 NOTE — PROGRESS NOTE ADULT - SUBJECTIVE AND OBJECTIVE BOX
Pt had k=5.7 this am; repeat k=5.6 (non hemolyzed)    - Case D/W Dr. Fink, who requested anesthesia recommendations.   - Per anesthesia, need to correct potassium prior to OR today.  Recommend giving 1 amp of D50 and 10 units of regular insulin, with repeat plasma K level thereafter  - Spoke with hospitalist, Dr. Díaz, EKG from this AM reviewed: no EKG changes due to K level and agree with D50/insulin regimen.   - Pt made aware and agree with plan

## 2018-05-18 NOTE — BRIEF OPERATIVE NOTE - PROCEDURE
<<-----Click on this checkbox to enter Procedure Cystoscopy with ureteroscopic laser lithotripsy of calculus of ureter  05/18/2018  right ureter with jj stent placement  Active  BETTY

## 2018-05-18 NOTE — DISCHARGE NOTE ADULT - CARE PLAN
Principal Discharge DX:	Renal colic on right side  Goal:	pain free  Assessment and plan of treatment:	s/p cystoscopy/ureteral stent placement  Secondary Diagnosis:	CAD (coronary artery disease)  Goal:	continue medications  , follow up opd pmd  Secondary Diagnosis:	Lupus erythematosus, unspecified form  Goal:	continue medications  , follow up opd pmd

## 2018-05-18 NOTE — PROGRESS NOTE ADULT - SUBJECTIVE AND OBJECTIVE BOX
INTERVAL HPI/OVERNIGHT EVENTS:Pt still with intermittant colic.  wants to proceed with ureteroscopy laser lith, stent right ureter    MEDICATIONS  (STANDING):  losartan 25 milliGRAM(s) Oral daily  metoprolol succinate ER 25 milliGRAM(s) Oral daily  morphine  - Injectable 4 milliGRAM(s) IV Push once  pantoprazole    Tablet 40 milliGRAM(s) Oral before breakfast  simvastatin 20 milliGRAM(s) Oral at bedtime  sodium chloride 0.9%. 1000 milliLiter(s) (100 mL/Hr) IV Continuous <Continuous>    MEDICATIONS  (PRN):  acetaminophen   Oral Tab/Cap - Peds 650 milliGRAM(s) Oral every 6 hours PRN For Temp greater than 38 C (100.4 F)  acetaminophen   Tablet 650 milliGRAM(s) Oral every 6 hours PRN mild pain/HA  HYDROmorphone  Injectable 1 milliGRAM(s) IV Push every 4 hours PRN Moderate Pain (4 - 6)  morphine  - Injectable 2 milliGRAM(s) IV Push every 4 hours PRN Mild Pain (1 - 3)  morphine  - Injectable 4 milliGRAM(s) IV Push every 4 hours PRN Moderate Pain (4 - 6)      Allergies    sulfa drugs (Rash)    Intolerances        Vital Signs Last 24 Hrs  T(C): 36.1 (18 May 2018 14:03), Max: 36.2 (17 May 2018 22:24)  T(F): 97 (18 May 2018 14:03), Max: 97.2 (17 May 2018 22:24)  HR: 69 (18 May 2018 14:03) (69 - 78)  BP: 114/64 (18 May 2018 14:03) (104/57 - 124/68)  BP(mean): --  RR: 16 (18 May 2018 14:03) (16 - 18)  SpO2: 98% (17 May 2018 22:24) (98% - 98%)     ON PE:  General: alert and awake  Abdomen:  :    LABS:                        10.9   5.57  )-----------( 352      ( 18 May 2018 05:48 )             34.9     05-18    141  |  113<H>  |  17  ----------------------------<  76  5.6<H>   |  18  |  1.4    Ca    9.2      18 May 2018 11:01    TPro  6.9  /  Alb  3.9  /  TBili  <0.2  /  DBili  x   /  AST  9   /  ALT  9   /  AlkPhos  81  05-18    PT/INR - ( 17 May 2018 15:55 )   PT: 10.90 sec;   INR: 1.01 ratio         PTT - ( 17 May 2018 15:55 )  PTT:27.6 sec  Urinalysis Basic - ( 17 May 2018 17:28 )    Color: Yellow / Appearance: Slightly Cloudy / S.015 / pH: x  Gluc: x / Ketone: Negative  / Bili: Negative / Urobili: 0.2 mg/dL   Blood: x / Protein: 30 mg/dL / Nitrite: Negative   Leuk Esterase: Negative / RBC: >50 /HPF / WBC Negative   Sq Epi: x / Non Sq Epi: Few /HPF / Bacteria: Few        RADIOLOGY & ADDITIONAL TESTS:      Impression: risks/benefits/alternatives discussed including failure, multiple treatment, sepsis    Plan:cystoscopy, right ureteroscopy, laser lithotripsy, stent placement

## 2018-05-18 NOTE — PROGRESS NOTE ADULT - ASSESSMENT
Right renal colic due to multiple ureteral stones    1. Pt agreeable to surgery today - Keep npo, iv fluids, pain mgmt  2. hperkalemia - ?hemolyzed specimen...f/u rpt bmp  3. Per cardio: low -mod risk for procedure

## 2018-05-18 NOTE — DISCHARGE NOTE ADULT - MEDICATION SUMMARY - MEDICATIONS TO STOP TAKING
I will STOP taking the medications listed below when I get home from the hospital:    Brilinta (ticagrelor) 90 mg oral tablet  -- 1 tab(s) by mouth 2 times a day    Aspir 81  -- orally once a day    simvastatin 20 mg oral tablet  -- 1 tab(s) by mouth once a day (at bedtime)    traMADol  -- 75 milligram(s) by mouth once a day    Urocit-K 10 mEq oral tablet, extended release  -- 1 tab(s) by mouth 3 times a day

## 2018-05-18 NOTE — DISCHARGE NOTE ADULT - MEDICATION SUMMARY - MEDICATIONS TO TAKE
I will START or STAY ON the medications listed below when I get home from the hospital:    losartan 25 mg oral tablet  -- 1 tab(s) by mouth once a day (in the evening)  -- Indication: For htn    metoprolol succinate 25 mg oral tablet, extended release  -- 1 tab(s) by mouth once (at bedtime)  -- Indication: For htn

## 2018-05-18 NOTE — DISCHARGE NOTE ADULT - CARE PROVIDER_API CALL
Anuja Fink), Urology  [address] [place] [address]  [address] [place] [id number]  Phone: [phone]  Fax: [phone]

## 2018-05-18 NOTE — CONSULT NOTE ADULT - ASSESSMENT
Patient history as above. She denies any recent chest pain or SOB. She had recent aorto femoral bypass without a problem. Her risk surgery is low to moderate. Continue meds. Check EKG

## 2018-05-18 NOTE — PROGRESS NOTE ADULT - SUBJECTIVE AND OBJECTIVE BOX
STATUS POST:      POST OPERATIVE DAY #:     Vital Signs Last 24 Hrs  T(C): 36.1 (18 May 2018 06:31), Max: 36.2 (17 May 2018 22:24)  T(F): 96.9 (18 May 2018 06:31), Max: 97.2 (17 May 2018 22:24)  HR: 72 (18 May 2018 06:31) (72 - 96)  BP: 104/57 (18 May 2018 06:31) (104/57 - 124/68)  BP(mean): --  RR: 16 (18 May 2018 06:31) (16 - 18)  SpO2: 98% (17 May 2018 22:24) (98% - 99%)      SUBJECTIVE: Pt seen    General Appearance: Appears well, NAD  Neck: Supple  Chest: Equal expansion bilaterally, equal breath sounds  CV: Pulse regular presently  Abdomen: Soft, nontense, mild right sided tenderness  Extremities: Grossly symmetric, SCD's in place     MEDICATIONS  (STANDING):  losartan 25 milliGRAM(s) Oral daily  metoprolol succinate ER 25 milliGRAM(s) Oral daily  morphine  - Injectable 4 milliGRAM(s) IV Push once  pantoprazole    Tablet 40 milliGRAM(s) Oral before breakfast  simvastatin 20 milliGRAM(s) Oral at bedtime  sodium chloride 0.9%. 1000 milliLiter(s) (100 mL/Hr) IV Continuous <Continuous>    MEDICATIONS  (PRN):  acetaminophen   Oral Tab/Cap - Peds 650 milliGRAM(s) Oral every 6 hours PRN For Temp greater than 38 C (100.4 F)  acetaminophen   Tablet 650 milliGRAM(s) Oral every 6 hours PRN mild pain/HA  HYDROmorphone  Injectable 1 milliGRAM(s) IV Push every 4 hours PRN Moderate Pain (4 - 6)  morphine  - Injectable 2 milliGRAM(s) IV Push every 4 hours PRN Mild Pain (1 - 3)  morphine  - Injectable 4 milliGRAM(s) IV Push every 4 hours PRN Moderate Pain (4 - 6)      LABS:                          10.9   5.57  )-----------( 352      ( 18 May 2018 05:48 )             34.9       05-18    142  |  112<H>  |  17  ----------------------------<  86  5.7<H>   |  17  |  1.5    Ca    9.0      18 May 2018 05:48    TPro  6.9  /  Alb  3.9  /  TBili  <0.2  /  DBili  x   /  AST  9   /  ALT  9   /  AlkPhos  81  05-18  PT/INR - ( 17 May 2018 15:55 )   PT: 10.90 sec;   INR: 1.01 ratio    PTT - ( 17 May 2018 15:55 )  PTT:27.6 sec    EKG;        Urinalysis Basic - ( 17 May 2018 17:28 )    Color: Yellow / Appearance: Slightly Cloudy / S.015 / pH: x  Gluc: x / Ketone: Negative  / Bili: Negative / Urobili: 0.2 mg/dL   Blood: x / Protein: 30 mg/dL / Nitrite: Negative   Leuk Esterase: Negative / RBC: >50 /HPF / WBC Negative   Sq Epi: x / Non Sq Epi: Few /HPF / Bacteria: Few      Consults:     Cardiology:  Patient history as above. She denies any recent chest pain or SOB. She had recent aorto femoral bypass without a problem. Her risk surgery is low to moderate. Continue meds. Check EKG

## 2018-05-18 NOTE — CHART NOTE - NSCHARTNOTEFT_GEN_A_CORE
Pt received D50/Insulin; ABG shows k=3.7  Dr. Fink and anesthesia made aware -- rosa isela proceed to OR. Pt also made aware

## 2018-05-18 NOTE — CONSULT NOTE ADULT - SUBJECTIVE AND OBJECTIVE BOX
CARDIOLOGY CONSULT NOTE     CHIEF COMPLAINT/REASON FOR CONSULT:    HPI:  48 y.o female presents to ED for right flank pain x3 days, waxes and wanes.  Pt has hx of kidney stones, follows with Dr. Fink, states this feels very similar to her previous stones.  Pt has hx of CKD, RA, lupus, MI, and had aortic-bifemoral bypass 2 months ago.  Pt dneies any focal weakness, numbness, tingling, back pain, abd pain, nausea, vomiting, CP, and SOB. (17 May 2018 22:11)      PAST MEDICAL & SURGICAL HISTORY:  Myocardial infarction: -  Chronic kidney disease: stage III  H/O heart artery stent: lad  CAD (coronary artery disease)  Renal tubular acidosis  Lupus  Sjogren's disease  S/P aorto-bifemoral bypass surgery  H/O  section  H/O cystoscopy: last being 3-16  H/O lithotripsy: eswl x5 last being 17      Cardiac Risks:   [ ]HTN, [ ] DM, [ ] Smoking, [x ] FH,  [ x] Lipids        MEDICATIONS:  MEDICATIONS  (STANDING):  losartan 25 milliGRAM(s) Oral daily  metoprolol succinate ER 25 milliGRAM(s) Oral daily  morphine  - Injectable 4 milliGRAM(s) IV Push once  pantoprazole    Tablet 40 milliGRAM(s) Oral before breakfast  simvastatin 20 milliGRAM(s) Oral at bedtime  sodium chloride 0.9%. 1000 milliLiter(s) (100 mL/Hr) IV Continuous <Continuous>      FAMILY HISTORY:      SOCIAL HISTORY:      [ ] Marital status   Allergies    sulfa drugs (Rash)    Intolerances    	    REVIEW OF SYSTEMS:  CONSTITUTIONAL: No fever, weight loss, or fatigue  EYES: No eye pain, visual disturbances, or discharge  ENMT:  No difficulty hearing, tinnitus, vertigo; No sinus or throat pain  NECK: No pain or stiffness  RESPIRATORY: No cough, wheezing, chills or hemoptysis; No Shortness of Breath  CARDIOVASCULAR: No chest pain, palpitations, passing out, dizziness, or leg swelling  GASTROINTESTINAL: No abdominal or epigastric pain. No nausea, vomiting, or hematemesis; No diarrhea or constipation. No melena or hematochezia.  GENITOURINARY: No dysuria, frequency, hematuria, or incontinence  NEUROLOGICAL: No headaches, memory loss, loss of strength, numbness, or tremors  SKIN: No itching, burning, rashes, or lesions   	    [ ] All others negative	  [ ] Unable to obtain    PHYSICAL EXAM:  T(C): 36.1 (18 @ 06:31), Max: 36.2 (18 @ 22:24)  HR: 72 (18 @ 06:31) (72 - 96)  BP: 104/57 (18 @ 06:31) (104/57 - 124/68)  RR: 16 (18 @ 06:31) (16 - 18)  SpO2: 98% (18 @ 22:24) (98% - 99%)  Wt(kg): --  I&O's Summary    17 May 2018 07:01  -  18 May 2018 07:00  --------------------------------------------------------  IN: 1000 mL / OUT: 0 mL / NET: 1000 mL        Appearance: Normal	  Psychiatry: A & O x 3, Mood & affect appropriate  HEENT:   Normal oral mucosa, PERRL, EOMI	  Lymphatic: No lymphadenopathy  Cardiovascular: Normal S1 S2,RRR, No JVD, No murmurs  Respiratory: Lungs clear to auscultation	  Gastrointestinal:  Soft, Non-tender, + BS	  Skin: No rashes, No ecchymoses, No cyanosis	  Neurologic: Non-focal  Extremities: Normal range of motion, No clubbing, cyanosis or edema  Vascular: Peripheral pulses palpable 2+ bilaterally      ECG:  	not avaiable    	  LABS:	 	    CARDIAC MARKERS:                                    10.9   5.57  )-----------( 352      ( 18 May 2018 05:48 )             34.9         142  |  112<H>  |  17  ----------------------------<  86  5.7<H>   |  17  |  1.5    Ca    9.0      18 May 2018 05:48    TPro  6.9  /  Alb  3.9  /  TBili  <0.2  /  DBili  x   /  AST  9   /  ALT  9   /  AlkPhos  81      PT/INR - ( 17 May 2018 15:55 )   PT: 10.90 sec;   INR: 1.01 ratio         PTT - ( 17 May 2018 15:55 )  PTT:27.6 sec  p

## 2018-05-18 NOTE — CHART NOTE - NSCHARTNOTEFT_GEN_A_CORE
PACU ANESTHESIA ADMISSION NOTE      Procedure: Cystoscopy with ureteroscopic laser lithotripsy of calculus of ureter: right ureter with jj stent placement    Post op diagnosis:  Ureteral stone      ____  Intubated  TV:______       Rate: ______      FiO2: ______    _x___  Patent Airway    __x__  Full return of protective reflexes    __x__  Full recovery from anesthesia / back to baseline     Vitals:   T:97.8           R:16                 BP: 93/51                 Sat:  99                 P: 87      Mental Status:  _x___ Awake   _x____ Alert   _____ Drowsy   _____ Sedated    Nausea/Vomiting: x ____ NO  ______Yes ,x   See Post - Op Orders          Pain Scale (0-10):  ____0_    Treatment: ____ None   x ____ See Post - Op/PCA Orders    Post - Operative Fluids:   ____ Oral   ____ See Post - Op Orders    Plan: Discharge:   ____Home      x _____Floor     _____Critical Care    _____  Other:_________________    Comments:

## 2018-05-22 ENCOUNTER — APPOINTMENT (OUTPATIENT)
Dept: VASCULAR SURGERY | Facility: CLINIC | Age: 48
End: 2018-05-22
Payer: COMMERCIAL

## 2018-05-22 VITALS
SYSTOLIC BLOOD PRESSURE: 118 MMHG | WEIGHT: 129 LBS | HEIGHT: 61 IN | BODY MASS INDEX: 24.35 KG/M2 | DIASTOLIC BLOOD PRESSURE: 70 MMHG

## 2018-05-22 DIAGNOSIS — I12.9 HYPERTENSIVE CHRONIC KIDNEY DISEASE WITH STAGE 1 THROUGH STAGE 4 CHRONIC KIDNEY DISEASE, OR UNSPECIFIED CHRONIC KIDNEY DISEASE: ICD-10-CM

## 2018-05-22 DIAGNOSIS — I73.9 PERIPHERAL VASCULAR DISEASE, UNSPECIFIED: ICD-10-CM

## 2018-05-22 DIAGNOSIS — M32.9 SYSTEMIC LUPUS ERYTHEMATOSUS, UNSPECIFIED: ICD-10-CM

## 2018-05-22 DIAGNOSIS — M06.9 RHEUMATOID ARTHRITIS, UNSPECIFIED: ICD-10-CM

## 2018-05-22 DIAGNOSIS — M35.00 SJOGREN SYNDROME, UNSPECIFIED: ICD-10-CM

## 2018-05-22 DIAGNOSIS — N29 OTHER DISORDERS OF KIDNEY AND URETER IN DISEASES CLASSIFIED ELSEWHERE: ICD-10-CM

## 2018-05-22 DIAGNOSIS — N13.2 HYDRONEPHROSIS WITH RENAL AND URETERAL CALCULOUS OBSTRUCTION: ICD-10-CM

## 2018-05-22 DIAGNOSIS — I25.2 OLD MYOCARDIAL INFARCTION: ICD-10-CM

## 2018-05-22 DIAGNOSIS — N25.89 OTHER DISORDERS RESULTING FROM IMPAIRED RENAL TUBULAR FUNCTION: ICD-10-CM

## 2018-05-22 DIAGNOSIS — Z79.82 LONG TERM (CURRENT) USE OF ASPIRIN: ICD-10-CM

## 2018-05-22 DIAGNOSIS — I25.10 ATHEROSCLEROTIC HEART DISEASE OF NATIVE CORONARY ARTERY WITHOUT ANGINA PECTORIS: ICD-10-CM

## 2018-05-22 DIAGNOSIS — E83.59 OTHER DISORDERS OF CALCIUM METABOLISM: ICD-10-CM

## 2018-05-22 DIAGNOSIS — Z88.2 ALLERGY STATUS TO SULFONAMIDES: ICD-10-CM

## 2018-05-22 DIAGNOSIS — Z95.5 PRESENCE OF CORONARY ANGIOPLASTY IMPLANT AND GRAFT: ICD-10-CM

## 2018-05-22 DIAGNOSIS — E87.5 HYPERKALEMIA: ICD-10-CM

## 2018-05-22 DIAGNOSIS — N18.3 CHRONIC KIDNEY DISEASE, STAGE 3 (MODERATE): ICD-10-CM

## 2018-05-22 PROCEDURE — 99024 POSTOP FOLLOW-UP VISIT: CPT

## 2018-05-22 PROCEDURE — 93978 VASCULAR STUDY: CPT

## 2018-05-31 ENCOUNTER — FORM ENCOUNTER (OUTPATIENT)
Age: 48
End: 2018-05-31

## 2018-06-01 ENCOUNTER — OUTPATIENT (OUTPATIENT)
Dept: OUTPATIENT SERVICES | Facility: HOSPITAL | Age: 48
LOS: 1 days | Discharge: HOME | End: 2018-06-01

## 2018-06-01 ENCOUNTER — APPOINTMENT (OUTPATIENT)
Dept: UROLOGY | Facility: CLINIC | Age: 48
End: 2018-06-01
Payer: COMMERCIAL

## 2018-06-01 VITALS
HEART RATE: 86 BPM | WEIGHT: 128 LBS | SYSTOLIC BLOOD PRESSURE: 103 MMHG | BODY MASS INDEX: 24.17 KG/M2 | HEIGHT: 61 IN | DIASTOLIC BLOOD PRESSURE: 70 MMHG

## 2018-06-01 DIAGNOSIS — I74.9 EMBOLISM AND THROMBOSIS OF UNSPECIFIED ARTERY: ICD-10-CM

## 2018-06-01 DIAGNOSIS — Z98.890 OTHER SPECIFIED POSTPROCEDURAL STATES: Chronic | ICD-10-CM

## 2018-06-01 DIAGNOSIS — Z98.891 HISTORY OF UTERINE SCAR FROM PREVIOUS SURGERY: Chronic | ICD-10-CM

## 2018-06-01 DIAGNOSIS — Z95.828 PRESENCE OF OTHER VASCULAR IMPLANTS AND GRAFTS: Chronic | ICD-10-CM

## 2018-06-01 LAB
BILIRUB UR QL STRIP: NORMAL
CLARITY UR: CLEAR
COLLECTION METHOD: NORMAL
GLUCOSE UR-MCNC: NORMAL
HCG UR QL: NORMAL EU/DL
HGB UR QL STRIP.AUTO: 250
KETONES UR-MCNC: NORMAL
LEUKOCYTE ESTERASE UR QL STRIP: 500
NITRITE UR QL STRIP: NORMAL
PH UR STRIP: 6.4
PROT UR STRIP-MCNC: 30
SP GR UR STRIP: 1010

## 2018-06-01 PROCEDURE — 81003 URINALYSIS AUTO W/O SCOPE: CPT | Mod: QW

## 2018-06-01 PROCEDURE — 52310 CYSTOSCOPY AND TREATMENT: CPT

## 2018-06-01 RX ORDER — TRAMADOL HYDROCHLORIDE AND ACETAMINOPHEN 37.5; 325 MG/1; MG/1
37.5-325 TABLET, FILM COATED ORAL
Qty: 90 | Refills: 0 | Status: ACTIVE | COMMUNITY
Start: 2018-04-25

## 2018-06-01 RX ORDER — CEPHALEXIN 250 MG/1
250 CAPSULE ORAL
Qty: 12 | Refills: 0 | Status: ACTIVE | COMMUNITY
Start: 2018-05-18

## 2018-06-01 RX ORDER — PHENAZOPYRIDINE HYDROCHLORIDE 100 MG/1
100 TABLET ORAL
Qty: 21 | Refills: 0 | Status: ACTIVE | COMMUNITY
Start: 2018-05-18

## 2018-06-01 RX ORDER — CEPHALEXIN 250 MG/1
250 TABLET ORAL TWICE DAILY
Qty: 4 | Refills: 0 | Status: ACTIVE | COMMUNITY
Start: 2018-06-01 | End: 1900-01-01

## 2018-06-05 ENCOUNTER — APPOINTMENT (OUTPATIENT)
Dept: VASCULAR SURGERY | Facility: CLINIC | Age: 48
End: 2018-06-05
Payer: COMMERCIAL

## 2018-06-05 DIAGNOSIS — I74.09 OTHER ARTERIAL EMBOLISM AND THROMBOSIS OF ABDOMINAL AORTA: ICD-10-CM

## 2018-06-05 PROCEDURE — 99213 OFFICE O/P EST LOW 20 MIN: CPT

## 2018-06-13 ENCOUNTER — OUTPATIENT (OUTPATIENT)
Dept: OUTPATIENT SERVICES | Facility: HOSPITAL | Age: 48
LOS: 1 days | Discharge: HOME | End: 2018-06-13

## 2018-06-13 DIAGNOSIS — Z95.828 PRESENCE OF OTHER VASCULAR IMPLANTS AND GRAFTS: Chronic | ICD-10-CM

## 2018-06-13 DIAGNOSIS — Z98.891 HISTORY OF UTERINE SCAR FROM PREVIOUS SURGERY: Chronic | ICD-10-CM

## 2018-06-13 DIAGNOSIS — N20.0 CALCULUS OF KIDNEY: ICD-10-CM

## 2018-06-13 DIAGNOSIS — Z98.890 OTHER SPECIFIED POSTPROCEDURAL STATES: Chronic | ICD-10-CM

## 2018-06-15 ENCOUNTER — APPOINTMENT (OUTPATIENT)
Dept: UROLOGY | Facility: CLINIC | Age: 48
End: 2018-06-15
Payer: COMMERCIAL

## 2018-06-15 VITALS
SYSTOLIC BLOOD PRESSURE: 110 MMHG | HEIGHT: 61 IN | WEIGHT: 128 LBS | BODY MASS INDEX: 24.17 KG/M2 | DIASTOLIC BLOOD PRESSURE: 71 MMHG | HEART RATE: 82 BPM

## 2018-06-15 PROCEDURE — 99213 OFFICE O/P EST LOW 20 MIN: CPT

## 2018-07-18 ENCOUNTER — APPOINTMENT (OUTPATIENT)
Dept: UROLOGY | Facility: CLINIC | Age: 48
End: 2018-07-18

## 2018-12-19 ENCOUNTER — APPOINTMENT (OUTPATIENT)
Dept: UROLOGY | Facility: CLINIC | Age: 48
End: 2018-12-19

## 2018-12-24 PROBLEM — M32.9 LUPUS: Status: ACTIVE | Noted: 2017-10-27

## 2019-06-10 ENCOUNTER — OUTPATIENT (OUTPATIENT)
Dept: OUTPATIENT SERVICES | Facility: HOSPITAL | Age: 49
LOS: 1 days | Discharge: HOME | End: 2019-06-10

## 2019-06-10 DIAGNOSIS — Z98.891 HISTORY OF UTERINE SCAR FROM PREVIOUS SURGERY: Chronic | ICD-10-CM

## 2019-06-10 DIAGNOSIS — Z98.890 OTHER SPECIFIED POSTPROCEDURAL STATES: Chronic | ICD-10-CM

## 2019-06-10 DIAGNOSIS — N18.4 CHRONIC KIDNEY DISEASE, STAGE 4 (SEVERE): ICD-10-CM

## 2019-06-10 DIAGNOSIS — N20.0 CALCULUS OF KIDNEY: ICD-10-CM

## 2019-06-10 DIAGNOSIS — Z95.828 PRESENCE OF OTHER VASCULAR IMPLANTS AND GRAFTS: Chronic | ICD-10-CM

## 2019-06-16 PROBLEM — I73.9 CLAUDICATION: Status: ACTIVE | Noted: 2017-10-27

## 2019-07-24 ENCOUNTER — TRANSCRIPTION ENCOUNTER (OUTPATIENT)
Age: 49
End: 2019-07-24

## 2019-08-25 ENCOUNTER — FORM ENCOUNTER (OUTPATIENT)
Age: 49
End: 2019-08-25

## 2019-08-26 ENCOUNTER — OUTPATIENT (OUTPATIENT)
Dept: OUTPATIENT SERVICES | Facility: HOSPITAL | Age: 49
LOS: 1 days | Discharge: HOME | End: 2019-08-26
Payer: COMMERCIAL

## 2019-08-26 DIAGNOSIS — Z98.890 OTHER SPECIFIED POSTPROCEDURAL STATES: Chronic | ICD-10-CM

## 2019-08-26 DIAGNOSIS — Z95.828 PRESENCE OF OTHER VASCULAR IMPLANTS AND GRAFTS: Chronic | ICD-10-CM

## 2019-08-26 DIAGNOSIS — Z98.891 HISTORY OF UTERINE SCAR FROM PREVIOUS SURGERY: Chronic | ICD-10-CM

## 2019-08-26 DIAGNOSIS — R91.8 OTHER NONSPECIFIC ABNORMAL FINDING OF LUNG FIELD: ICD-10-CM

## 2019-08-26 PROCEDURE — 71250 CT THORAX DX C-: CPT | Mod: 26

## 2020-10-14 ENCOUNTER — OUTPATIENT (OUTPATIENT)
Dept: OUTPATIENT SERVICES | Facility: HOSPITAL | Age: 50
LOS: 1 days | Discharge: HOME | End: 2020-10-14
Payer: COMMERCIAL

## 2020-10-14 DIAGNOSIS — Z98.890 OTHER SPECIFIED POSTPROCEDURAL STATES: Chronic | ICD-10-CM

## 2020-10-14 DIAGNOSIS — Z95.828 PRESENCE OF OTHER VASCULAR IMPLANTS AND GRAFTS: Chronic | ICD-10-CM

## 2020-10-14 DIAGNOSIS — Z98.891 HISTORY OF UTERINE SCAR FROM PREVIOUS SURGERY: Chronic | ICD-10-CM

## 2020-10-14 DIAGNOSIS — I25.10 ATHEROSCLEROTIC HEART DISEASE OF NATIVE CORONARY ARTERY WITHOUT ANGINA PECTORIS: ICD-10-CM

## 2020-10-14 PROCEDURE — 78452 HT MUSCLE IMAGE SPECT MULT: CPT | Mod: 26

## 2021-05-12 ENCOUNTER — APPOINTMENT (OUTPATIENT)
Dept: UROLOGY | Facility: CLINIC | Age: 51
End: 2021-05-12
Payer: COMMERCIAL

## 2021-05-12 VITALS — WEIGHT: 140 LBS | BODY MASS INDEX: 26.43 KG/M2 | HEIGHT: 61 IN | TEMPERATURE: 98 F

## 2021-05-12 DIAGNOSIS — N23 UNSPECIFIED RENAL COLIC: ICD-10-CM

## 2021-05-12 PROCEDURE — 99214 OFFICE O/P EST MOD 30 MIN: CPT

## 2021-05-12 PROCEDURE — 99072 ADDL SUPL MATRL&STAF TM PHE: CPT

## 2021-05-12 NOTE — REVIEW OF SYSTEMS
[Fever] : no fever [Sore Throat] : no sore throat [Chest Pain] : no chest pain [Cough] : no cough [Constipation] : no constipation [Dysuria] : no dysuria [Confused] : no confusion

## 2021-05-12 NOTE — HISTORY OF PRESENT ILLNESS
[FreeTextEntry1] : 51-year-old with long history of bilateral nephrolithiasis, renal tubular acidosis, under the care of nephrology and currently on Urocit-K along with lemon juice supplementation to her water. patient has history of multiple ESWL and ureteroscopy's. Patient has significant coronary artery disease, peripheral vascular and aortic vascular disease, lupus. She does get some Bilateralflank pain and presents today with a new KUB which shows no significant change in stone bulk from prior KUB 3 years prior but has bilateral calyceal stones multiple up to 9 mm.She continues on Plavix and aspirin.  I reviewed the KUB images

## 2021-05-12 NOTE — ASSESSMENT
[FreeTextEntry1] : Bilateral kidney stones up to 9 mm and flank pain. I discussed fully with the patient risks benefits and alternatives including observation, ESWL and ureteroscopy. Despite the fact that the stones did not appear to be significantly larger since KUB 3 years ago patient is complaining of pain and does not want to proceed with observation only. She understands that some of her pain might be due to back problems and may not improve with treatment of the stones. Because of the multiple calyceal stone she would need multiple procedures to clear the stones. She does not want ureteroscopy and stent. She wants to proceed with ESWL even though it may require multiple treatments. Risk of hematoma, failure, obstruction requiring stent discussed. She would need cardiac clearance and stopping of brilinta and aspirin one week prior

## 2021-06-07 ENCOUNTER — RESULT REVIEW (OUTPATIENT)
Age: 51
End: 2021-06-07

## 2021-06-07 ENCOUNTER — OUTPATIENT (OUTPATIENT)
Dept: OUTPATIENT SERVICES | Facility: HOSPITAL | Age: 51
LOS: 1 days | Discharge: HOME | End: 2021-06-07
Payer: COMMERCIAL

## 2021-06-07 VITALS
TEMPERATURE: 99 F | HEIGHT: 61 IN | SYSTOLIC BLOOD PRESSURE: 118 MMHG | HEART RATE: 85 BPM | OXYGEN SATURATION: 97 % | DIASTOLIC BLOOD PRESSURE: 69 MMHG | WEIGHT: 147.93 LBS | RESPIRATION RATE: 15 BRPM

## 2021-06-07 DIAGNOSIS — Z01.818 ENCOUNTER FOR OTHER PREPROCEDURAL EXAMINATION: ICD-10-CM

## 2021-06-07 DIAGNOSIS — N20.2 CALCULUS OF KIDNEY WITH CALCULUS OF URETER: ICD-10-CM

## 2021-06-07 DIAGNOSIS — Z98.890 OTHER SPECIFIED POSTPROCEDURAL STATES: Chronic | ICD-10-CM

## 2021-06-07 DIAGNOSIS — Z98.891 HISTORY OF UTERINE SCAR FROM PREVIOUS SURGERY: Chronic | ICD-10-CM

## 2021-06-07 DIAGNOSIS — Z95.828 PRESENCE OF OTHER VASCULAR IMPLANTS AND GRAFTS: Chronic | ICD-10-CM

## 2021-06-07 LAB
ALBUMIN SERPL ELPH-MCNC: 5.1 G/DL — SIGNIFICANT CHANGE UP (ref 3.5–5.2)
ALP SERPL-CCNC: 90 U/L — SIGNIFICANT CHANGE UP (ref 30–115)
ALT FLD-CCNC: 26 U/L — SIGNIFICANT CHANGE UP (ref 0–41)
ANION GAP SERPL CALC-SCNC: 12 MMOL/L — SIGNIFICANT CHANGE UP (ref 7–14)
APPEARANCE UR: CLEAR — SIGNIFICANT CHANGE UP
APTT BLD: 31.1 SEC — SIGNIFICANT CHANGE UP (ref 27–39.2)
AST SERPL-CCNC: 20 U/L — SIGNIFICANT CHANGE UP (ref 0–41)
BASOPHILS # BLD AUTO: 0.06 K/UL — SIGNIFICANT CHANGE UP (ref 0–0.2)
BASOPHILS NFR BLD AUTO: 0.7 % — SIGNIFICANT CHANGE UP (ref 0–1)
BILIRUB SERPL-MCNC: 0.3 MG/DL — SIGNIFICANT CHANGE UP (ref 0.2–1.2)
BILIRUB UR-MCNC: NEGATIVE — SIGNIFICANT CHANGE UP
BUN SERPL-MCNC: 29 MG/DL — HIGH (ref 10–20)
CALCIUM SERPL-MCNC: 10.5 MG/DL — HIGH (ref 8.5–10.1)
CHLORIDE SERPL-SCNC: 103 MMOL/L — SIGNIFICANT CHANGE UP (ref 98–110)
CO2 SERPL-SCNC: 24 MMOL/L — SIGNIFICANT CHANGE UP (ref 17–32)
COLOR SPEC: COLORLESS — SIGNIFICANT CHANGE UP
CREAT SERPL-MCNC: 1.8 MG/DL — HIGH (ref 0.7–1.5)
DIFF PNL FLD: NEGATIVE — SIGNIFICANT CHANGE UP
EOSINOPHIL # BLD AUTO: 0.18 K/UL — SIGNIFICANT CHANGE UP (ref 0–0.7)
EOSINOPHIL NFR BLD AUTO: 2.1 % — SIGNIFICANT CHANGE UP (ref 0–8)
GLUCOSE SERPL-MCNC: 76 MG/DL — SIGNIFICANT CHANGE UP (ref 70–99)
GLUCOSE UR QL: NEGATIVE — SIGNIFICANT CHANGE UP
HCT VFR BLD CALC: 40.2 % — SIGNIFICANT CHANGE UP (ref 37–47)
HGB BLD-MCNC: 12.8 G/DL — SIGNIFICANT CHANGE UP (ref 12–16)
IMM GRANULOCYTES NFR BLD AUTO: 0.8 % — HIGH (ref 0.1–0.3)
INR BLD: 0.94 RATIO — SIGNIFICANT CHANGE UP (ref 0.65–1.3)
KETONES UR-MCNC: NEGATIVE — SIGNIFICANT CHANGE UP
LEUKOCYTE ESTERASE UR-ACNC: NEGATIVE — SIGNIFICANT CHANGE UP
LYMPHOCYTES # BLD AUTO: 2.08 K/UL — SIGNIFICANT CHANGE UP (ref 1.2–3.4)
LYMPHOCYTES # BLD AUTO: 24.3 % — SIGNIFICANT CHANGE UP (ref 20.5–51.1)
MCHC RBC-ENTMCNC: 30.1 PG — SIGNIFICANT CHANGE UP (ref 27–31)
MCHC RBC-ENTMCNC: 31.8 G/DL — LOW (ref 32–37)
MCV RBC AUTO: 94.6 FL — SIGNIFICANT CHANGE UP (ref 81–99)
MONOCYTES # BLD AUTO: 0.96 K/UL — HIGH (ref 0.1–0.6)
MONOCYTES NFR BLD AUTO: 11.2 % — HIGH (ref 1.7–9.3)
NEUTROPHILS # BLD AUTO: 5.21 K/UL — SIGNIFICANT CHANGE UP (ref 1.4–6.5)
NEUTROPHILS NFR BLD AUTO: 60.9 % — SIGNIFICANT CHANGE UP (ref 42.2–75.2)
NITRITE UR-MCNC: NEGATIVE — SIGNIFICANT CHANGE UP
NRBC # BLD: 0 /100 WBCS — SIGNIFICANT CHANGE UP (ref 0–0)
PH UR: 7 — SIGNIFICANT CHANGE UP (ref 5–8)
PLATELET # BLD AUTO: 287 K/UL — SIGNIFICANT CHANGE UP (ref 130–400)
POTASSIUM SERPL-MCNC: 5.1 MMOL/L — HIGH (ref 3.5–5)
POTASSIUM SERPL-SCNC: 5.1 MMOL/L — HIGH (ref 3.5–5)
PROT SERPL-MCNC: 8.1 G/DL — HIGH (ref 6–8)
PROT UR-MCNC: NEGATIVE — SIGNIFICANT CHANGE UP
PROTHROM AB SERPL-ACNC: 10.8 SEC — SIGNIFICANT CHANGE UP (ref 9.95–12.87)
RBC # BLD: 4.25 M/UL — SIGNIFICANT CHANGE UP (ref 4.2–5.4)
RBC # FLD: 12.6 % — SIGNIFICANT CHANGE UP (ref 11.5–14.5)
SODIUM SERPL-SCNC: 139 MMOL/L — SIGNIFICANT CHANGE UP (ref 135–146)
SP GR SPEC: 1.01 — SIGNIFICANT CHANGE UP (ref 1.01–1.03)
UROBILINOGEN FLD QL: SIGNIFICANT CHANGE UP
WBC # BLD: 8.56 K/UL — SIGNIFICANT CHANGE UP (ref 4.8–10.8)
WBC # FLD AUTO: 8.56 K/UL — SIGNIFICANT CHANGE UP (ref 4.8–10.8)

## 2021-06-07 PROCEDURE — 93010 ELECTROCARDIOGRAM REPORT: CPT

## 2021-06-07 PROCEDURE — 71046 X-RAY EXAM CHEST 2 VIEWS: CPT | Mod: 26

## 2021-06-07 NOTE — H&P PST ADULT - ATTENDING COMMENTS
Multiple bilateral kidney stones for right eswl.  Because of stone bulk will likely require multiple treatments.  Also discussed possible failure, obstruction requiring stent, hematoma.  Pt stopped brilinta and asa 1 week ago in preparation for thios procedure and knows to restart it tomorrow

## 2021-06-07 NOTE — H&P PST ADULT - REASON FOR ADMISSION
PT PRESENTS TO PAST WITH NO SOB, CP, PALPITATIONS, DYSURIA, UTI OR URI AT PRESENT.   PT ABLE TO WALK UP 1/2 FLIGHTS OF STEPS WITH NO SOB.  EXERCISE LIMITED.   AS PER THE PT, THIS IS HIS/HER COMPLETE MEDICAL AND SURGICAL HX, INCLUDING MEDICATIONS PRESCRIBED AND OVER THE COUNTER  pt denies any covid s/s, or tested positive in the past  pt advised self quarantine till day of procedure  denies travel outside the [place] in the past 30 days  Anesthesia Alert  NO--Difficult Airway  NO--History of neck surgery or radiation  NO--Limited ROM of neck  NO--History of Malignant hyperthermia  NO--Personal or family history of Pseudocholinesterase deficiency  NO--Prior Anesthesia Complication  NO--Latex Allergy  NO--Loose teeth  NO--History of Rheumatoid Arthritis  NO--HENRI  NO BLEEDING RISK  NO--Other_____  PT SCHEDULED FOR RIGHT- ESWL.  PT REPORTS- I HAVE KIDNEY STONES. I HAVE HAD KIDNEY STONES FOR YEARS.    RECENTLY THEY HAVE GOTTEN BIGGER.

## 2021-06-07 NOTE — H&P PST ADULT - NSICDXPASTMEDICALHX_GEN_ALL_CORE_FT
PAST MEDICAL HISTORY:  CAD (coronary artery disease)     Chronic kidney disease stage III    COPD (chronic obstructive pulmonary disease)     H/O heart artery stent lad    Lupus     Myocardial infarction 8-2017    Renal tubular acidosis     Sjogren's disease

## 2021-06-09 LAB
CULTURE RESULTS: SIGNIFICANT CHANGE UP
SPECIMEN SOURCE: SIGNIFICANT CHANGE UP

## 2021-06-25 ENCOUNTER — OUTPATIENT (OUTPATIENT)
Dept: OUTPATIENT SERVICES | Facility: HOSPITAL | Age: 51
LOS: 1 days | Discharge: HOME | End: 2021-06-25

## 2021-06-25 ENCOUNTER — LABORATORY RESULT (OUTPATIENT)
Age: 51
End: 2021-06-25

## 2021-06-25 DIAGNOSIS — Z95.828 PRESENCE OF OTHER VASCULAR IMPLANTS AND GRAFTS: Chronic | ICD-10-CM

## 2021-06-25 DIAGNOSIS — Z98.890 OTHER SPECIFIED POSTPROCEDURAL STATES: Chronic | ICD-10-CM

## 2021-06-25 DIAGNOSIS — Z98.891 HISTORY OF UTERINE SCAR FROM PREVIOUS SURGERY: Chronic | ICD-10-CM

## 2021-06-25 DIAGNOSIS — Z11.59 ENCOUNTER FOR SCREENING FOR OTHER VIRAL DISEASES: ICD-10-CM

## 2021-06-25 PROBLEM — J44.9 CHRONIC OBSTRUCTIVE PULMONARY DISEASE, UNSPECIFIED: Chronic | Status: ACTIVE | Noted: 2021-06-07

## 2021-06-28 ENCOUNTER — APPOINTMENT (OUTPATIENT)
Dept: UROLOGY | Facility: HOSPITAL | Age: 51
End: 2021-06-28

## 2021-06-28 ENCOUNTER — RESULT REVIEW (OUTPATIENT)
Age: 51
End: 2021-06-28

## 2021-06-28 ENCOUNTER — OUTPATIENT (OUTPATIENT)
Dept: OUTPATIENT SERVICES | Facility: HOSPITAL | Age: 51
LOS: 1 days | Discharge: HOME | End: 2021-06-28
Payer: COMMERCIAL

## 2021-06-28 VITALS
WEIGHT: 139.99 LBS | RESPIRATION RATE: 18 BRPM | HEIGHT: 61 IN | SYSTOLIC BLOOD PRESSURE: 117 MMHG | OXYGEN SATURATION: 97 % | TEMPERATURE: 97 F | HEART RATE: 81 BPM | DIASTOLIC BLOOD PRESSURE: 73 MMHG

## 2021-06-28 VITALS — SYSTOLIC BLOOD PRESSURE: 110 MMHG | DIASTOLIC BLOOD PRESSURE: 61 MMHG | HEART RATE: 71 BPM

## 2021-06-28 DIAGNOSIS — Z95.828 PRESENCE OF OTHER VASCULAR IMPLANTS AND GRAFTS: Chronic | ICD-10-CM

## 2021-06-28 DIAGNOSIS — Z98.890 OTHER SPECIFIED POSTPROCEDURAL STATES: Chronic | ICD-10-CM

## 2021-06-28 DIAGNOSIS — Z98.891 HISTORY OF UTERINE SCAR FROM PREVIOUS SURGERY: Chronic | ICD-10-CM

## 2021-06-28 PROCEDURE — 50590 FRAGMENTING OF KIDNEY STONE: CPT | Mod: RT

## 2021-06-28 PROCEDURE — 74018 RADEX ABDOMEN 1 VIEW: CPT | Mod: 26

## 2021-06-28 RX ORDER — HYDROMORPHONE HYDROCHLORIDE 2 MG/ML
0.5 INJECTION INTRAMUSCULAR; INTRAVENOUS; SUBCUTANEOUS ONCE
Refills: 0 | Status: DISCONTINUED | OUTPATIENT
Start: 2021-06-28 | End: 2021-06-28

## 2021-06-28 RX ORDER — TICAGRELOR 90 MG/1
1 TABLET ORAL
Qty: 0 | Refills: 0 | DISCHARGE

## 2021-06-28 RX ORDER — SODIUM CHLORIDE 9 MG/ML
1000 INJECTION, SOLUTION INTRAVENOUS
Refills: 0 | Status: DISCONTINUED | OUTPATIENT
Start: 2021-06-28 | End: 2021-07-12

## 2021-06-28 RX ORDER — CEPHALEXIN 500 MG
1 CAPSULE ORAL
Qty: 12 | Refills: 0
Start: 2021-06-28 | End: 2021-06-30

## 2021-06-28 RX ORDER — LOSARTAN POTASSIUM 100 MG/1
1 TABLET, FILM COATED ORAL
Qty: 0 | Refills: 0 | DISCHARGE

## 2021-06-28 RX ORDER — METOPROLOL TARTRATE 50 MG
1 TABLET ORAL
Qty: 0 | Refills: 0 | DISCHARGE

## 2021-06-28 RX ORDER — CEVIMELINE 30 MG/1
1 CAPSULE ORAL
Qty: 0 | Refills: 0 | DISCHARGE

## 2021-06-28 RX ORDER — ASPIRIN/CALCIUM CARB/MAGNESIUM 324 MG
1 TABLET ORAL
Qty: 0 | Refills: 0 | DISCHARGE

## 2021-06-28 RX ORDER — POTASSIUM CITRATE MONOHYDRATE 100 %
1 POWDER (GRAM) MISCELLANEOUS
Qty: 0 | Refills: 0 | DISCHARGE

## 2021-06-28 RX ORDER — PREGABALIN 225 MG/1
1 CAPSULE ORAL
Qty: 0 | Refills: 0 | DISCHARGE

## 2021-06-28 RX ORDER — SIMVASTATIN 20 MG/1
1 TABLET, FILM COATED ORAL
Qty: 0 | Refills: 0 | DISCHARGE

## 2021-06-28 RX ADMIN — SODIUM CHLORIDE 100 MILLILITER(S): 9 INJECTION, SOLUTION INTRAVENOUS at 11:31

## 2021-06-28 NOTE — ASU DISCHARGE PLAN (ADULT/PEDIATRIC) - CARE PROVIDER_API CALL
Anuja Fink)  Urology  [address] [place] [address], [address]  [place] [address]  Phone: [phone]  Fax: [phone]  Follow Up Time: 2 weeks

## 2021-06-28 NOTE — ASU PREOP CHECKLIST - DNR CLARIFICATION FORM COMPLETED
Called pt prior to zoom visit with Dr. Brown at 8:20 am. Pt states she feels well. No fever or chills. No pain issues. Pt states neuropathy is improving, but still has some mild numbness. See ROS below:    Review of Systems   Constitutional: Negative.    HENT:  Negative.    Eyes: Negative.    Respiratory: Negative.    Cardiovascular: Negative.    Gastrointestinal: Negative.    Endocrine: Negative.    Genitourinary: Negative.     Musculoskeletal: Negative.    Skin: Negative.    Neurological: Positive for numbness.   Hematological: Negative.    Psychiatric/Behavioral: Negative.       n/a

## 2021-06-28 NOTE — ASU PATIENT PROFILE, ADULT - FALL HARM RISK CONCLUSION
Oniel Jones is a 64 y.o. male that was discharged in stable condition. The patients diagnosis, condition and treatment were explained to  patient and aftercare instructions were given. The patient verbalized understanding. Patient armband removed and shredded. Universal Safety Interventions

## 2021-06-28 NOTE — ASU PATIENT PROFILE, ADULT - PMH
CAD (coronary artery disease)    Chronic kidney disease  stage III  COPD (chronic obstructive pulmonary disease)    H/O heart artery stent  lad  Lupus    Myocardial infarction  8-2017  Renal tubular acidosis    Sjogren's disease

## 2021-07-01 DIAGNOSIS — I25.10 ATHEROSCLEROTIC HEART DISEASE OF NATIVE CORONARY ARTERY WITHOUT ANGINA PECTORIS: ICD-10-CM

## 2021-07-01 DIAGNOSIS — I25.2 OLD MYOCARDIAL INFARCTION: ICD-10-CM

## 2021-07-01 DIAGNOSIS — L93.0 DISCOID LUPUS ERYTHEMATOSUS: ICD-10-CM

## 2021-07-01 DIAGNOSIS — N18.30 CHRONIC KIDNEY DISEASE, STAGE 3 UNSPECIFIED: ICD-10-CM

## 2021-07-01 DIAGNOSIS — N20.0 CALCULUS OF KIDNEY: ICD-10-CM

## 2021-07-01 DIAGNOSIS — M35.00 SJOGREN SYNDROME, UNSPECIFIED: ICD-10-CM

## 2021-07-21 ENCOUNTER — APPOINTMENT (OUTPATIENT)
Dept: UROLOGY | Facility: CLINIC | Age: 51
End: 2021-07-21

## 2021-08-04 ENCOUNTER — APPOINTMENT (OUTPATIENT)
Dept: UROLOGY | Facility: CLINIC | Age: 51
End: 2021-08-04
Payer: COMMERCIAL

## 2021-08-04 VITALS — WEIGHT: 140 LBS | HEIGHT: 61 IN | BODY MASS INDEX: 26.43 KG/M2

## 2021-08-04 DIAGNOSIS — N20.0 CALCULUS OF KIDNEY: ICD-10-CM

## 2021-08-04 PROCEDURE — 99024 POSTOP FOLLOW-UP VISIT: CPT

## 2021-08-04 NOTE — HISTORY OF PRESENT ILLNESS
[FreeTextEntry1] : Patient with multiple bilateral kidney stones. She is status post right ESWL. She has not passed any fragments. KUB was reviewed and shows some fragmentation of the stones but the fragment still in the right kidney. The largest stone on that side did not fragment. She's still has multiple calyceal stones. She also has left calyceal stones. It's occasional left flank pain.

## 2021-08-04 NOTE — REVIEW OF SYSTEMS
[Feeling Poorly] : not feeling poorly [Chest Pain] : no chest pain [Shortness Of Breath] : no shortness of breath [Constipation] : no constipation [Dysuria] : no dysuria

## 2021-08-04 NOTE — ASSESSMENT
[FreeTextEntry1] : Discussed fully with the patient. There is still significant stone bulk in both kidneys although there are some fragmented stones in the right side post ESWL. I recommend waiting an allowing stones to pass. Discuss possible left ESWL or second right ESWL. Patient understands that no one procedure can render her stone free as she has multiple calyceal stones bilaterally in multiple calyces. Patient prefers expectant treatment at this time and will consider her options regarding future ESWL which I discussed

## 2021-10-12 NOTE — PATIENT PROFILE ADULT. - PROVIDER NOTIFICATION
Attempted to call. Call was picked up but nobody responded on receiving end. Will try again later.    Declines

## 2021-10-14 RX ORDER — FLUTICASONE PROPIONATE AND SALMETEROL 250; 50 UG/1; UG/1
250-50 POWDER RESPIRATORY (INHALATION)
Qty: 1 | Refills: 5 | Status: ACTIVE | COMMUNITY
Start: 2021-10-14 | End: 1900-01-01

## 2021-11-10 RX ORDER — FLUTICASONE PROPIONATE AND SALMETEROL 250; 50 UG/1; UG/1
250-50 POWDER RESPIRATORY (INHALATION) TWICE DAILY
Qty: 3 | Refills: 3 | Status: ACTIVE | COMMUNITY
Start: 2021-11-10 | End: 1900-01-01

## 2022-07-12 NOTE — PATIENT PROFILE ADULT. - CURRENT SWALLOWING
SSKI Counseling:  I discussed with the patient the risks of SSKI including but not limited to thyroid abnormalities, metallic taste, GI upset, fever, headache, acne, arthralgias, paraesthesias, lymphadenopathy, easy bleeding, arrhythmias, and allergic reaction. (0) swallows foods and liquids w/o difficulty

## 2023-08-04 NOTE — BRIEF OPERATIVE NOTE - COMMENTS
to ICU for recovery. Doppler pedal signals present.
23-year-old female presents emergency department complaining of left ankle pain and right knee pain after she twisted her right ankle while at work this past Tuesday causing her to fall and strike her right knee on the ground.  Patient reports initially the pain was not so bad however the has become worse over the past several days.  Denies any other injuries.

## 2023-10-13 NOTE — H&P PST ADULT - NS MD HP INPLANTS MED DEV
[facility] Hospitalist Internal Medicine   Progress Note              Subjective:  Patient seen and examined by me.  10/13  - no acute overnight events  - no new complaints  - patient feeling anxious about her arrhythmia - became tearful during encounter  - denies CP or SOB  - overall feels better since admission  - antoine RN    10/12  No acute overnight events reported  Patient has no new complaints  Denies CP SOB   dw RN    10/11   S/P [facility] this afternoon. Tolerated well, no acute periop complications. Currently feels better, no chest pain SOB at this time.Discussed cardiac cath results                Review of Systems:   Constitutional: no fatigue, fever or chills.  Skin: No rash, bruising or discoloration  Eyes: No vision changes, no pain, erythema or drainage.    ENT: No change in hearing, no sore throat or trouble swallowing, no nasal discharge   Endocrine: no polyuria, polydipsia, heat or cold intolerance.  Cardiovascular: No chest pain, chest heaviness or palpitations  Respiratory: No cough, shortness of breath, or wheezing  Gastrointestinal: No nausea, vomiting or diarrhea, no constipation. No abdominal pain, melena or hematochezia  Musculoskeletal: No joint swelling or stiffness, no muscle soreness.    Neurologic: No headache, no numbness or weakness.   Hematologic: No unusual bruising or bleeding  Psychiatric: No SI/HI AH/VHI/O's    Intake/Output Summary (Last 24 hours) at 10/13/2023 1451  Last data filed at 10/13/2023 1300  Gross per 24 hour   Intake 362.81 ml   Output 2350 ml   Net -1987.19 ml           ALLERGIES:  Patient has no known allergies.     Hospital Meds  Current Facility-Administered Medications   Medication Dose Route Frequency Provider Last Rate Last Admin   • sodium chloride 0.9% infusion   Intravenous Continuous Spencer Padilla MD       • sodium chloride 0.9% infusion   Intravenous Continuous Spencer Padilla MD       • dextrose 5 % infusion   Intravenous Continuous Spencer Padilla MD        • acetaminophen (TYLENOL) suppository 650 mg  650 mg Rectal Q4H PRN Jesse Harper MD       • sodium chloride 0.9 % flush bag 25 mL  25 mL Intravenous PRN Jesse Harper MD       • sodium chloride 0.9 % injection 2 mL  2 mL Intracatheter 2 times per day Jesse Harper MD   2 mL at 10/13/23 0801   • sodium chloride (NORMAL SALINE) 0.9 % bolus 500 mL  500 mL Intravenous PRN Matilde Knowles, DO       • sodium chloride 0.9 % flush bag 25 mL  25 mL Intravenous PRN Matilde Knowles, DO       • sodium chloride 0.9 % injection 2 mL  2 mL Intracatheter 2 times per day Matilde Knowles DO   2 mL at 10/13/23 0801   • Potassium Standard Replacement Protocol (Levels 3.5 and lower)   Does not apply See Admin Instructions Matilde Knowles,        • Potassium Replacement (Levels 3.6 - 4)   Does not apply See Admin Instructions Matilde Knowles,        • Magnesium Standard Replacement Protocol   Does not apply See Admin Instructions Matilde Knowles DO       • Phosphorus Standard Replacement Protocol   Does not apply See Admin Instructions Matilde Knowles, DO       • ondansetron (ZOFRAN) injection 4 mg  4 mg Intravenous Q6H PRN Matilde Knowles, DO       • acetaminophen (TYLENOL) tablet 650 mg  650 mg Oral Q4H PRN Matilde Knowles,    650 mg at 10/10/23 1736   • polyethylene glycol (MIRALAX) packet 17 g  17 g Oral Daily PRN Matilde Knowles, DO       • docusate sodium-sennosides (SENOKOT S) 50-8.6 MG 2 tablet  2 tablet Oral Daily PRN Matilde Knowles, DO       • sodium chloride 0.9 % flush bag 25 mL  25 mL Intravenous PRN Matilde Knowles, DO       • dextrose 50 % injection 25 g  25 g Intravenous PRN Matilde Knowles, DO       • dextrose 50 % injection 12.5 g  12.5 g Intravenous PRN Matilde Knowles, DO       • glucagon (GLUCAGEN) injection 1 mg  1 mg Intramuscular PRN Matilde Knowles, DO       • dextrose (GLUTOSE) 40 % gel 15 g  15 g Oral PRN Matilde Knowles, DO       • dextrose (GLUTOSE) 40 % gel 30 g  30 g Oral PRN Matilde Knowles,        • insulin  lispro (ADMELOG,HumaLOG) - Correction Dose   Subcutaneous 4x Daily WC Knowles, Matilde, DO   1 Units at 10/13/23 1300   • apixaBAN (ELIQUIS) tablet 5 mg  5 mg Oral 2 times per day Knowles, Matilde, DO   5 mg at 10/13/23 0800   • aspirin chewable 81 mg  81 mg Oral Daily Knowles, Matilde, DO   81 mg at 10/13/23 0800   • atorvastatin (LIPITOR) tablet 40 mg  40 mg Oral Daily Knowles, Matilde, DO   40 mg at 10/13/23 0800   • budesonide-formoterol (SYMBICORT) 80-4.5 MCG/ACT inhaler 2 puff  2 puff Inhalation BID Resp Knowles, Matilde, DO   2 puff at 10/13/23 0800   • ferrous sulfate (65 mg Fe per 325 mg) tablet 325 mg  325 mg Oral Daily with breakfast KnowlesMatilde, DO   325 mg at 10/13/23 0800   • [Held by provider] flecainide (TAMBOCOR) tablet 50 mg  50 mg Oral 2 times per day Knowles, Matilde, DO       • [Held by provider] furosemide (LASIX) tablet 40 mg  40 mg Oral BID Knowles, Matilde, DO       • gabapentin (NEURONTIN) capsule 100 mg  100 mg Oral TID KnowlesMatilde, DO   100 mg at 10/13/23 1306   • [Held by provider] macitentan (OPSUMIT) tablet 10 mg  10 mg Oral Daily Kendy Henry, APNP   10 mg at 10/10/23 1706   • metoPROLOL succinate (TOPROL-XL) ER tablet 25 mg  25 mg Oral Daily Eleni Matilde, DO   25 mg at 10/13/23 1017   • [Held by provider] treprostinil (ORENITRAM) ER tablet 5 mg  5 mg Oral TID Kendy Henry, APNP   5 mg at 10/10/23 1705   • sildenafil (REVATIO) tablet 20 mg  20 mg Oral 3 times per day Kendy Henry, APNP   20 mg at 10/13/23 1306   • albuterol inhaler 2 puff  2 puff Inhalation Q4H Resp PRN Jessica Luther MD       • furosemide (LASIX INJECT) injection 60 mg  60 mg Intravenous BID Armando Melton NP   60 mg at 10/13/23 0801        Last Recorded Vitals      SpO2 Readings from Last 3 Encounters:   10/12/23 100%   09/26/23 96%   09/21/23 96%        VITAL SIGNS:     Vital Last Value 24 Hour Range   Temperature 98.4 °F (36.9 °C) (10/13/23 1200) Temp  Min: 97.9 °F (36.6 °C)  Max: 98.6 °F (37 °C)   Pulse 70  (10/13/23 1300) Pulse  Min: 58  Max: 84   Respiratory 18 (10/13/23 1300) Resp  Min: 15  Max: 24   Non-Invasive  Blood Pressure 103/69 (10/13/23 1200) BP  Min: 103/69  Max: 125/54   Pulse Oximetry 100 % (10/12/23 2200) SpO2  Min: 100 %  Max: 100 %     Vital Today Admitted   Weight 76.4 kg (168 lb 6.9 oz) (10/12/23 0800) Weight: 77.2 kg (170 lb 3.1 oz) (10/10/23 1000)   Height N/A Height: 5' 4\" (162.6 cm) (10/10/23 1000)   BMI N/A BMI (Calculated): 29.21 (10/10/23 1000)       Physical Exam:  General: Alert and oriented, no acute distress, comfortable sitting up in bed  HENT: head AT/NC, no discharge from ears, normal oral/nasal mucosa  Eyes: no scleral icterus, no conjunctival erythema , EOMI, PERRLA  Cardio: RRR, S1, S2, no murmur, rub, or gallops  Pulm: Lungs diminished  GI: Soft, non-tender, nondistended. Bowel sounds present.  : No suprapubic tenderness, no CVA tenderness bilaterally  Ext: No upper or lower extremity edema. Acyanotic, warm to touch, pulses palpable.    Musculoskeletal: . Moves all 4 extremities. No joint effusions.  Skin: Warm, dry, no rashes  Psych: Appropriate mood and affect. Speech clear and appropriate.   Neuro: CN 2-12 grossly in tact, No focal neurologic deficits. Speech clear and coherent.       Labs     Recent Labs   Lab 10/13/23  0326 10/12/23  2042 10/12/23  1235 10/12/23  0221 10/11/23  1812 10/11/23  0220 10/10/23  1857 10/10/23  1146   SODIUM 139  --  138 140   < > 139 136 135   POTASSIUM 4.4 4.0 3.7 4.3   < > 4.4 4.6 4.7   CHLORIDE 103  --  100 103   < > 100 99 99   CO2 31  --  33* 33*   < > 32 31 30   BUN 24*  --  28* 35*   < > 36* 30* 24*   CREATININE 0.78  --  0.82 0.88   < > 1.15* 1.08* 1.00*   GLUCOSE 152*  --  142* 89   < > 109* 193* 165*   ALBUMIN  --   --   --   --   --   --  3.6 3.7   PHOS 3.3  --   --   --   --  3.7  --   --    AST  --   --   --   --   --   --  46* 50*   BILIRUBIN  --   --   --   --   --   --  0.2 0.2    < > = values in this interval not displayed.        Recent Labs   Lab 10/13/23  0326 10/12/23  0221 10/11/23  0220   WBC 7.7 8.0 11.0   RBC 3.05* 3.04* 2.94*   HGB 9.2* 9.1* 8.8*   HCT 29.8* 29.9* 27.9*    248 256           Imaging    XR CHEST AP OR PA   Final Result      No acute change in cardiopulmonary appearance as described. Devices as   above.       Electronically Signed by: SHIRAZ KENNEY M.D.    Signed on: 10/13/2023 12:15 PM    Workstation ID: [id number]      XR CHEST AP OR PA   Final Result   Impression:   Stable exam as above. Support devices as above.      Electronically Signed by: SUKI NASSAR MD    Signed on: 10/13/2023 10:22 AM    Workstation ID: [id number]      NM AMYLOID CARDIAC IMAGING   Final Result   No scintigraphic evidence of cardiac amyloidosis ( Grade 0).                  Electronically Signed by: JUSTICE AGARWAL MD    Signed on: 10/12/2023 4:50 PM    Workstation ID: [id number]      XR CHEST AP OR PA   Final Result   1.   Cardiomegaly. Mild central congestion. Small left pleural effusion      Electronically Signed by: YVETTE VALENTINO MD    Signed on: 10/12/2023 9:41 AM    Workstation ID: [id number]      Cath/PV Case   Final Result      XR CHEST AP OR PA   Final Result      Prominence of the pulmonary vasculature with small pleural effusions   suggesting mild pulmonary edema, likely due to CHF in the setting of   cardiomegaly.       Electronically Signed by: SHIRAZ KENNEY M.D.    Signed on: 10/10/2023 1:24 PM    Workstation ID: [id number]          Cultures  Microbiology Results     None             Assessment/Plan:    Mony Ramirez is a 78-year-old female with past medical history significant for pulmonary hypertension, chronic hypoxic hypercapnic respiratory failure secondary to COPD on 3 L nasal cannula, HFpEF, A-fib on Eliquis, T2DM, and obesity who is presenting as transfer from [facility] for NSTEMI and cardiology evaluation.     #Dyspnea likely 2/2 acute on chronic HFpEF exacerbation  #Pulmonary hypertension s/p  RHC 7/2023  #Hypertension  - [facility] 10/11/23 - no significant vessel stenosis, mildly increased RH pressures  - further per pulm HTN team/cardiology  -TTE with EF 37% with G2DD  -RHC done July 2023 with reduced cardiac output and moderately elevated RA, RV, and PAWP pressures  -Patient started on pulmonary vasodilators at the time and follows up with pulmonary hypertension clinic in the outpatient setting  -Patient admitted in August for similar diastolic heart failure exacerbation and was on an Lasix drip at the time  -Hold home Lasix, patient given IV Lasix 60 mg x 1 per cardiology team  -NTproBNP elevated to 14,930 which is above patient's normal baseline and CXR done 10/10/2023 also reveals prominence of pulmonary vasculature with small pleural effusions suggesting mild pulmonary edema in the setting of CHF  -Diuresis as per cardiology  -GDMT he with Jardiance statin, and metoprolol succinate     #Elevated troponin likely 2/2 NSTEMI  -Troponin elevated to 4.470 at OSH, repeat troponin drawn here elevated to 21,930  -Pulmonary hypertension team evaluating the patient --> repeat troponin ordered and treatment of CHF exacerbation as listed above  -Patient with recent cath in July 2023, results noted as listed above --> discussed with cardiology, plan to do RHC tomorrow, hold eliquis until then and patient started on heparin gtt     #LINUS on CKD2 - resolved  - creatinine at baseline  -Cardiorenal based on CXR and history  -NT proBNP elevated and creatinine bumped from baseline of 0.8-1.0  -Diuresis as listed above  -Avoid IVF at this time  -Avoid nephrotoxic agents  -Monitor on BMP daily     #Atrial fibrillation  - hx VTach  - tikosyn initiated  - on eliquis  -Patient is currently rate controlled, monitor on telemetry  -Continue home digoxin and flecainide  -hold eliquis per cards for RHC tomorrow, on heparin gtt     #Lactic acidosis likely reactive to CHF exacerbation and NSTEMI  - last LA 10/10 4.3  -Based on paper  chart noted from OSH, lactic acid initially 3.9, appears to be downtrending here at 2.3 but will order repeat in a few hours to ensure that it is normalizing     #Chronic respiratory failure 2/2 COPD on 3 L nasal cannula  - at baseline O2 requirment  -Upon admission to [facility], there was a concern for COPD exacerbation and patient was given methylprednisolone, Rocephin, and azithromycin however patient is not wheezing on my exam and does not clinically appear to be in COPD exacerbation  -Respiratory status is years to be due to decompensated CHF as discussed above  -Resume home inhalers (it appears that patient had run out and was not able to see her doctor in time)  -Patient currently on baseline 3L NC saturating well  -Social work on consult as patient believes there are issues with oxygen concentrator and oxygen flow - may need new     #Anemia of chronic disease  -Hemoglobin up to be at baseline  -No overt bleeding noted on exam  -Transfuse for hemoglobin goal greater than 7  -Continue ferrous sulfate     #Hyperlipidemia  -continue atorvastatin     #T2DM  -continue sliding scale insulin  -hold home antihyperglycemic agents  -hypoglycemic protocol and accuchek       Dispo:         DVT Prophylaxis: apixaBAN - 5 MG  apixaBAN Tab - 5 MG   Diet:   Dietary Orders (From admission, onward)     Start     Ordered    10/11/23 1641  Cardiac Diet  (Coronary or Peripheral Angiogram (Diagnostic Only))  DIET EFFECTIVE NOW        Question:  Diet Modifiers  Answer:  Cardiac    10/11/23 1642               Baseline Activity: Ambulates Independently    CODE STATUS: FULL CODE    Code Status: Full Resuscitation    Communication: with patient, nurse       MORE than 50 MINS WERE SPENT ON THIS PATIENTS CARE TODAY, more than 50% of which was spent coordinating patient care. This includes endering the following: Reviewed all vitals, medications, new orders, I/O, labs, micro, radiology, nurses notes, pertinent consultant notes  which are reflected in assessment and plan.This does not include time spent on other items of care such as smoking cessation counseling, prolonged care time, and or advanced care planning if applicable.     Note to Patient: The 21st Century Cures Act makes medical notes like these available to patients in the interest of transparency. However, be advised this is a medical document. It is intended as peer to peer communication. It is written in medical language and may contain abbreviations or verbiage that are unfamiliar. It may appear blunt or direct. Medical documents are intended to carry relevant information, facts as evident, and the clinical opinion of the practitioner.  This note may have been transcribed using a voice dictation system. Voice recognition errors may occur. This should not be taken to alter the content or meaning of this note.         Primary Care Physician  Carolina Vides MD Mandy L Enshiwat, MD  [facility] Hospitalist  10/13/2023 2:51 PM         None

## 2023-12-31 NOTE — DISCHARGE NOTE ADULT - NS AS ACTIVITY OBS
Occupational Therapy    Evaluation    Patient Name: Zo Nunez  MRN: [id number]  Today's Date: 12/31/2023  Time Calculation  Start Time: 0942  Stop Time: 1005  Time Calculation (min): 23 min        Assessment:  OT Assessment: Orders received, evaluation performed. pt. presents with impaired balance, impaired coordination, impaired cognitive-communication skills and impaired visual-perceptual skills which limits ability to perform ADLs/transfers as per PLOF  Prognosis: Good  Barriers to Discharge: Inaccessible home environment, Decreased caregiver support  Evaluation/Treatment Tolerance: Patient tolerated treatment well  Medical Staff Made Aware: Yes  End of Session Communication: Bedside nurse  End of Session Patient Position: Up in chair, Alarm on  OT Assessment Results: Decreased ADL status, Decreased safe judgment during ADL, Decreased cognition, Decreased endurance, Visual deficit, Decreased fine motor control, Decreased functional mobility  Prognosis: Good  Barriers to Discharge: Inaccessible home environment, Decreased caregiver support  Evaluation/Treatment Tolerance: Patient tolerated treatment well  Medical Staff Made Aware: Yes  Strengths: Premorbid level of function, Ability to acquire knowledge  Barriers to Participation: Housing layout, Comorbidities  Plan:  Treatment Interventions: ADL retraining, Visual perceptual retraining, Functional transfer training, Endurance training, Cognitive reorientation, Patient/family training, Equipment evaluation/education, Neuromuscular reeducation, Fine motor coordination activities, Compensatory technique education  OT Frequency: 4 times per week  OT Discharge Recommendations: High intensity level of continued care  OT - OK to Discharge: Yes  Treatment Interventions: ADL retraining, Visual perceptual retraining, Functional transfer training, Endurance training, Cognitive reorientation, Patient/family training, Equipment evaluation/education, Neuromuscular  "reeducation, Fine motor coordination activities, Compensatory technique education    Subjective   Current Problem:  1. Cerebrovascular accident (CVA), unspecified mechanism (CMS/HCC)  Transthoracic Echo (TTE) Limited    Transthoracic Echo (TTE) Limited      2. Other specified transient cerebral ischemias  Transthoracic Echo (TTE) Limited    Transthoracic Echo (TTE) Limited      3. Other cerebrovascular disease  Transthoracic Echo (TTE) Limited        General:  General  Reason for Referral: pt. is a 70 y/o F who presented to the hospital with R sided weakness, facial droop and aphasia, pt. found have an acute CVA, referred to therapy to address new onset weakness affecting ADLs and transfers/mobility  Referred By: Db DAVID  Past Medical History Relevant to Rehab: afib, HTN, alcoholism  Family/Caregiver Present: No  Co-Treatment: PT  Co-Treatment Reason: to maximize pt. safety and mobility  Prior to Session Communication: Bedside nurse  Patient Position Received: Bed, 3 rail up, Alarm on  Precautions:  Medical Precautions: Fall precautions  Vital Signs:  BP: (!) 189/108  Pain:  Pain Assessment  Pain Assessment: Gay-Baker FACES  Pain Score: 0 - No pain    Objective   Cognition:  Overall Cognitive Status:  (patient pleasant, expressive aphasia demonstrated; followed ~75-90% simple commands with visual cues. patient appropraitely tearful regarding mobility)  Impulsive: Mildly  Processing Speed: Delayed           Home Living:  Home Living Comments: per EMR, pt lives in [facility], up/down unit with 1 bathroom and 2 bedrooms. level entry.  Prior Function:  Level of [place]: Independent with ADLs and functional transfers (unclear, pt nods \"yes\" to independent prior)  IADL History:     ADL:  LE Dressing Assistance:  (CGA)  LE Dressing Deficit: Don/doff R sock, Don/doff L sock  Activity Tolerance:     Bed Mobility/Transfers: Bed Mobility 1  Bed Mobility 1: Supine to sitting  Level of Assistance 1: Moderate assistance, " Moderate tactile cues, Moderate verbal cues    Transfers  Transfer: Yes  Transfer 1  Transfer From 1: Sit to  Transfer to 1: Stand  Technique 1: Sit to stand  Transfer Device 1: Gait belt  Transfer Level of Assistance 1: Hand held assistance, Minimum assistance, Moderate verbal cues, Moderate tactile cues, +2  Trials/Comments 1:  (cues to sequence)  Transfers 2  Transfer From 2: Bed to  Transfer to 2: Chair with arms  Technique 2:  (ambulatory)  Transfer Device 2: Gait belt  Transfer Level of Assistance 2: Minimum assistance, +2, Moderate verbal cues, Moderate tactile cues        Vision:    and Vision - Complex Assessment  Tracking:  (impaired scanning to R side with B eyes)  Sensation:  Light Touch: No apparent deficits  Sharp/Dull: No apparent deficits       Perception:  Inattention/Neglect: Appears intact  Initiation: Cues to initiate tasks  Motor Planning: Cues to use objects appropriately  Perseveration: Not present  Coordination:  Movements are Fluid and Coordinated:  (mild FMC impairments R hand)   Hand Function:  Gross Grasp: Functional  Coordination: Functional  Extremities: RUE   RUE : Within Functional Limits, LUE   LUE: Within Functional Limits, RLE   RLE : Within Functional Limits, and LLE   LLE : Within Functional Limits    Outcome Measures:[facility] Daily Activity  Putting on and taking off regular lower body clothing: A little  Bathing (including washing, rinsing, drying): A little  Putting on and taking off regular upper body clothing: A little  Toileting, which includes using toilet, bedpan or urinal: A little  Taking care of personal grooming such as brushing teeth: A little  Eating Meals: Total  Daily Activity - Total Score: 16        Education Documentation  ADL Training, taught by Ban Schmidt OT at 12/31/2023 11:52 AM.  Learner: Patient  Readiness: Acceptance  Method: Explanation  Response: Verbalizes Understanding    Education Comments  No comments found.        OP EDUCATION:        Goals:  Encounter Problems       Encounter Problems (Active)       ADLs       Patient will perform UB and LB bathing with supervision level of assistance.       Start:  12/31/23    Expected End:  01/14/24            Patient with complete lower body dressing with supervision level of assistance donning and doffing all LE clothes       Start:  12/31/23    Expected End:  01/14/24            Patient will complete toileting including hygiene clothing management/hygiene with supervision level of assistance.       Start:  12/31/23    Expected End:  01/14/24               BALANCE       Pt will maintain dynamic standing balance during ADL task with supervision level of assistance in order to demonstrate decreased risk of falling and improved postural control.       Start:  12/31/23    Expected End:  01/14/24                       TRANSFERS       Patient will complete functional transfers with least restrictive device with supervision level of assistance.       Start:  12/31/23    Expected End:  01/14/24                                 No Heavy lifting/straining

## 2024-01-02 NOTE — H&P PST ADULT - NSICDXPASTSURGICALHX_GEN_ALL_CORE_FT
"[facility]   Physical Therapy Treatment Note      Name: Bobbi COKER [facility] Number: [id number]    Therapy Diagnosis:   Encounter Diagnoses   Name Primary?    Neuromuscular scoliosis of thoracic region     Neuromuscular scoliosis of thoracolumbar region     Generalized muscle weakness Yes     Physician: Iker Dang MD    Visit Date: 1/2/2024    Physician Orders: PT Eval and Treat   Medical Diagnosis from Referral: see above   Evaluation Date: 11/14/2023  Authorization Period Expiration: 1/12/2024   Plan of Care Expiration: 1/12/2024     Date of Surgery: 5/17/2023  Visit # / Visits authorized: 9/17    FOTO: not performed due to young age     Precautions: Standard      Time In: 1347  Time Out: 1417  Total Billable Time: 30 minutes    PTA Visit #: 2/5     Received Plan of Care per Lavonne Kauffman PT    Subjective     Patient reports: not hurting like she was; mom present and asked that we "take it easy today"   She was compliant with home exercise program.  Response to previous treatment: no complaints  Functional change: none    Pain: 0/10  Location: medial border of right scapula      Objective      Objective Measures updated at progress report unless specified.     Treatment     BOBBI received the treatments listed below:      therapeutic exercises to develop strength, endurance, flexibility, posture, and core stabilization for 15 minutes including:  Upper body ergometer x 5 minutes - reverse to activate scapular stabilizers  Corner stretch 4 x 20 second hold   Supine over long towel roll w/ overpressure for pec minor stretch 4 x 20 second hold   HS Stretch (B) 4x20 second   Calf Stretch 4x20 second     manual therapy techniques: Myofacial release and Soft tissue Mobilization were applied for 0 minutes, including:  Scapular release on right - MFR to right medial border of scapula  Kinesiotape to scapula to promote depression and retraction   Hamstring stretching bilateral lower " extremities  Overpressure into extension bilateral lower extremities    neuromuscular re-education activities to improve: Coordination, Proprioception, Posture, and scapular/core/lumbar stabilization for 15 minutes. The following activities were included:  Scapular retraction/extension red - seated on blue ball 3 second hold x 20  Standing opposite arm/leg lifts - red x 20 each side (not performed today)   Prone retraction x 20  Prone W - 3 second hold x 20  Rows 2 plates x 20 repetitions each    (not performed today)   Wall/ball squats 5 second hold x 20  Seated march on blue ball x 20 each leg  Dead bugs w/ ball x 20 each side  Bridges with march - green 10x each leg  Bird Dogs 10x5 sec (B)  Swiss Ball isometrics 20x5 sec hold      therapeutic activities to improve functional performance for 0 minutes, including:  (not performed today)   Planks 7 x 15 second hold - cueing for proper form  Double knee to chest w/ yellow ball x 20  Palloff press - blue 2 x 10 each way  High knees with 5# dumbbell overhead 2 x 15 each leg   Balance on rocker board 3 minutes total  Scapular retraction/extension - blue -     direct contact modalities after being cleared for contraindications:     supervised modalities after being cleared for contradictions:     Patient Education and Home Exercises       Education provided:   - cues for proper form with exercises    Written Home Exercises Provided: Patient instructed to cont prior HEP. Exercises were reviewed and BOBBI was able to demonstrate them prior to the end of the session.  BOBBI demonstrated good  understanding of the education provided. See Electronic Medical Record under Patient Instructions for exercises provided during therapy sessions    Assessment     Evaluation assessment:  Bobbi is a 16 y.o. female referred to outpatient Physical Therapy with a medical diagnosis of s/p posterior fusion for scoliosis deformity. Patient presents with mild/moderate complaints of pain, some  decreased strength (left>right) and endurance, and bilateral hamstring tightness following posterior fusion for progressive neuromuscular scoliosis. Lilibeth has also undergone decompression surgery for Chiari I malformation last year. Will work on improving core strength, scapular and lumbar stability and improve overall strength and endurance to decrease pain, improve ability to perform activiites of daily living and improve quality of life.    Current assessment:  No c/o during or after session; pt with persistent pec tightness, left shoulder external rotation tightness  LILIBETH Is progressing well towards her goals.   Patient prognosis is Good.     Patient will continue to benefit from skilled outpatient physical therapy to address the deficits listed in the problem list box on initial evaluation, provide pt/family education and to maximize pt's level of independence in the home and community environment.      Anticipated barriers to physical therapy: Chiari malformation and progressive neuromuscular scoliosis    Goals:   Patient will be independent with home exercise program to facilitate carryover between visits.  2.  Patient will have 0 degrees resting knee extension on left lower extremity for improved quad control.  3.  Patient will increase bilateral lower extremity strength to 5/5 for improved ability to ambulate and perform activiites of daily living.  4.  Patient will increase bilateral shoulder strength to 5/5 and scapular stabilizer strength to 4-4+/5 to improve posture and ability to perform   activities of daily living.  5.  Patient will increase hamstring length by 10-15 degrees bilaterally to improve functional mobility.  6.  Patient will be able to bend forward with pain </= 1/10 to be able to put on socks and shoes without pain.  7.  Patient will report decrease in pain with functional activities to 1/10 on average and </= 3/10 at worst for improved quality of life.    Plan     Plan of care  Certification: 11/14/2023 to 1/12/2024.     Outpatient Physical Therapy 2 times weekly for 8 weeks to include the following interventions: Electrical Stimulation NMES/IFC/premod as needed, Manual Therapy, Moist Heat/ Ice, Neuromuscular Re-ed, Patient Education, Therapeutic Activities, Therapeutic Exercise, and Ultrasound.      Continue per Plan of Care and progress as pt able   Kristan Rahman, PTA    01/02/2024     PAST SURGICAL HISTORY:  H/O  section     H/O cystoscopy last being 3-18-16    H/O lithotripsy eswl x5 last being 6-    S/P aorto-bifemoral bypass surgery

## 2024-07-30 NOTE — PROGRESS NOTE ADULT - ASSESSMENT
No ASSESSMENT/PLAN: 47y Female s/p aortobifemoral bypass for occluded distal aorta with claudication     Neurologic: GCS 15, on dilaudid PCA pump for pain    Respiratory: cta b/l  Incentive spirometer, keep bedrest for now    Cardiovascular: recent h/o of MI and PCI/stent  (on ASA/Brillinta), 1st set of CE negative, f/up 2 mores sets.   continue metoprolol iv , hold asa/brilinta     Gastrointestinal/Nutrition: keep NPO with NGT in place, D51/2NS @ 75/hr    Genitourinary/Renal:  renal tubular acidosis, ckd4 making good urine barroso in place, strict I and Os  continue ivf, trend cr 1.6 preop > 1.4 post op    Hematologic: pad lupus, sjogren syndrome, dopplerable pulses bilaterally hold heparin sq, no scds per vascular team  h/h stable    Infectious Disease: none    Lines/Tubes: piv, r rad coty, barroso    Endocrine: no endo issues    Disposition: cont SICU care ASSESSMENT/PLAN: 47y Female s/p aortobifemoral bypass for occluded distal aorta with claudication     Neurologic: GCS 15, on dilaudid PCA pump for pain    Respiratory: cta b/l  Incentive spirometer, may get OOB today    Cardiovascular: recent h/o of MI and PCI/stent  (on ASA/Brillinta)   continue metoprolol iv , hold asa/brilinta     Gastrointestinal/Nutrition: keep NPO with NGT in place, D51/2NS @ 75/hr    Genitourinary/Renal:  renal tubular acidosis, ckd4 making good urine barroso in place, strict I and Os  continue ivf,     Hematologic: pad lupus, sjogren syndrome, palpable pulses b/l, cont to hold heparin sq, no scds per vascular team    Infectious Disease: none    Lines/Tubes: piv, r rad coty, barroso    Endocrine: no endo issues    Disposition: cont SICU care ASSESSMENT/PLAN: 47y Female s/p aortobifemoral bypass for occluded distal aorta with claudication     Neurologic: GCS 15, on dilaudid PCA pump for pain    Respiratory: cta b/l  Incentive spirometer, may get OOB today    Cardiovascular: recent h/o of MI and PCI/stent  (on ASA/Brillinta)   continue metoprolol iv , hold asa/brilinta for now    Gastrointestinal/Nutrition: keep NPO with NGT in place, D51/2NS @ 75/hr    Genitourinary/Renal:  renal tubular acidosis, ckd4 making good urine barroso in place, strict I and Os  continue ivf,     Hematologic: pad lupus, sjogren syndrome, palpable pulses b/l, cont to hold heparin sq, no scds per vascular team    Infectious Disease: none    Lines/Tubes: piv, r rad coty, barroso    Endocrine: no endo issues    Disposition: cont PCA pump for pain  encourage incentive spirometer 10x q 1hr  cont NGT with IVF for now, PPI  OOB to chair today  consider d/c barroso  address ASA, SQ Heparin with vascular service  consider transfer to floor

## 2024-08-23 NOTE — ED ADULT NURSE NOTE - DISCHARGE DATE/TIME
What Type Of Note Output Would You Prefer (Optional)?: Bullet Format
How Severe Is Your Rash?: moderate
Is This A New Presentation, Or A Follow-Up?: Rash
Additional History: Pt reports the only new thing in the last week she was eating iris a lot. Several years ago she had a spider bit which worsened with iris so she stopped eating iris. She restarted last week and the rash appeared
17-May-2018 22:16

## 2024-09-16 ENCOUNTER — APPOINTMENT (OUTPATIENT)
Dept: OBGYN | Facility: CLINIC | Age: 54
End: 2024-09-16
Payer: COMMERCIAL

## 2024-09-16 VITALS
WEIGHT: 130 LBS | DIASTOLIC BLOOD PRESSURE: 87 MMHG | BODY MASS INDEX: 24.55 KG/M2 | HEART RATE: 106 BPM | HEIGHT: 61 IN | SYSTOLIC BLOOD PRESSURE: 125 MMHG

## 2024-09-16 DIAGNOSIS — N76.0 ACUTE VAGINITIS: ICD-10-CM

## 2024-09-16 DIAGNOSIS — Z12.31 ENCOUNTER FOR SCREENING MAMMOGRAM FOR MALIGNANT NEOPLASM OF BREAST: ICD-10-CM

## 2024-09-16 DIAGNOSIS — Z01.419 ENCOUNTER FOR GYNECOLOGICAL EXAMINATION (GENERAL) (ROUTINE) W/OUT ABNORMAL FINDINGS: ICD-10-CM

## 2024-09-16 LAB
BILIRUB UR QL STRIP: NORMAL
CLARITY UR: CLEAR
COLLECTION METHOD: NORMAL
GLUCOSE UR-MCNC: NORMAL
HCG UR QL: 0.2 EU/DL
HGB UR QL STRIP.AUTO: NORMAL
KETONES UR-MCNC: NORMAL
LEUKOCYTE ESTERASE UR QL STRIP: NORMAL
NITRITE UR QL STRIP: NORMAL
PH UR STRIP: 7
PROT UR STRIP-MCNC: NORMAL
SP GR UR STRIP: 1.01

## 2024-09-16 PROCEDURE — 99213 OFFICE O/P EST LOW 20 MIN: CPT | Mod: 25

## 2024-09-16 PROCEDURE — 81003 URINALYSIS AUTO W/O SCOPE: CPT | Mod: QW

## 2024-09-16 PROCEDURE — 99386 PREV VISIT NEW AGE 40-64: CPT | Mod: 25

## 2024-09-16 RX ORDER — FEXOFENADINE HYDROCHLORIDE 180 MG/1
180 TABLET ORAL DAILY
Qty: 30 | Refills: 6 | Status: ACTIVE | COMMUNITY
Start: 2024-09-16 | End: 1900-01-01

## 2024-09-16 RX ORDER — FLUCONAZOLE 150 MG/1
150 TABLET ORAL
Qty: 4 | Refills: 6 | Status: ACTIVE | COMMUNITY
Start: 2024-09-16 | End: 1900-01-01

## 2024-09-16 NOTE — PHYSICAL EXAM
[Examination Of The Breasts] : a normal appearance [No Masses] : no breast masses were palpable [Labia Majora] : normal [Normal] : normal [Uterine Adnexae] : normal [FreeTextEntry1] : erythematous rash right labia patient has been taking eczema meds cream  [FreeTextEntry4] : minimal white discharge

## 2024-09-16 NOTE — PLAN
[FreeTextEntry1] : patient to stop the eczema cream and take diflucan 150 mg po qod for 7 days and fexophenadine 180 mg po qd for allergies and to avoid contact with all local chemical irritants

## 2024-09-23 LAB — HPV HIGH+LOW RISK DNA PNL CVX: NOT DETECTED

## 2024-09-26 LAB — CYTOLOGY CVX/VAG DOC THIN PREP: ABNORMAL

## 2024-12-11 NOTE — PATIENT PROFILE ADULT. - ATTEMPT TO OOB
12/09/24 80.6 kg (177 lb 12.8 oz)   12/09/24 80 kg (176 lb 4.8 oz)   12/03/24 79.4 kg (175 lb)   11/25/24 82.8 kg (182 lb 9.6 oz)   11/18/24 85.2 kg (187 lb 14.4 oz)   11/20/24 85.3 kg (188 lb)   11/19/24 85.4 kg (188 lb 3.2 oz)   11/18/24 85.1 kg (187 lb 11.2 oz)           Medical problems and test results were reviewed with the patient today.     Current Outpatient Medications   Medication Sig Dispense Refill    oxyCODONE (ROXICODONE) 5 MG immediate release tablet Take 1 tablet by mouth every 4 hours as needed for Pain for up to 7 days. Max Daily Amount: 30 mg 42 tablet 0    silver sulfADIAZINE (SILVADENE) 1 % cream Apply topically 3-5 times daily to area of skin reaction 50 g 0    pantoprazole (PROTONIX) 40 MG tablet Take 1 tablet by mouth 2 times daily (before meals) 60 tablet 0    sucralfate (CARAFATE) 1 GM tablet Take 1 tablet by mouth 4 times daily (before meals and nightly) 120 tablet 0    Vitamin D (CHOLECALCIFEROL) 25 MCG (1000 UT) TABS tablet Take 1 tablet by mouth daily 30 tablet 0    ondansetron (ZOFRAN) 4 MG tablet Take 1 tablet by mouth every 8 hours as needed for Nausea or Vomiting      metoprolol tartrate (LOPRESSOR) 25 MG tablet Take 1 tablet by mouth 2 times daily 180 tablet 3    atorvastatin (LIPITOR) 40 MG tablet Take 1 tablet by mouth every evening 90 tablet 3    aspirin 81 MG EC tablet Take 1 tablet by mouth daily      nitroGLYCERIN (NITROSTAT) 0.4 MG SL tablet Place 1 tablet under the tongue every 5 minutes as needed for Chest pain (x 3 doses) 25 tablet PRN    acetaminophen (TYLENOL) 325 MG tablet Take 2 tablets by mouth every 6 hours as needed      lidocaine viscous hcl (XYLOCAINE) 2 % SOLN solution Take 15 mLs by mouth every 6 hours as needed for Irritation (Patient not taking: Reported on 12/11/2024) 100 mL 3    albuterol sulfate HFA (VENTOLIN HFA) 108 (90 Base) MCG/ACT inhaler Inhale 2 puffs into the lungs 4 times daily as needed for Wheezing (Patient not taking: Reported on 12/11/2024)  no
